# Patient Record
Sex: FEMALE | Race: WHITE | NOT HISPANIC OR LATINO | Employment: FULL TIME | URBAN - METROPOLITAN AREA
[De-identification: names, ages, dates, MRNs, and addresses within clinical notes are randomized per-mention and may not be internally consistent; named-entity substitution may affect disease eponyms.]

---

## 2018-03-11 ENCOUNTER — HOSPITAL ENCOUNTER (EMERGENCY)
Facility: HOSPITAL | Age: 23
Discharge: HOME | End: 2018-03-12
Attending: EMERGENCY MEDICINE | Admitting: EMERGENCY MEDICINE
Payer: COMMERCIAL

## 2018-03-11 VITALS
WEIGHT: 125 LBS | RESPIRATION RATE: 18 BRPM | HEART RATE: 80 BPM | DIASTOLIC BLOOD PRESSURE: 77 MMHG | SYSTOLIC BLOOD PRESSURE: 116 MMHG | HEIGHT: 68 IN | BODY MASS INDEX: 18.94 KG/M2 | OXYGEN SATURATION: 100 % | TEMPERATURE: 97.6 F

## 2018-03-11 DIAGNOSIS — S06.0X9A CONCUSSION: ICD-10-CM

## 2018-03-11 DIAGNOSIS — S06.9X9A MILD TRAUMATIC BRAIN INJURY (HCC): Primary | ICD-10-CM

## 2018-03-12 PROCEDURE — 99283 EMERGENCY DEPT VISIT LOW MDM: CPT
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
## 2018-03-14 ENCOUNTER — HOSPITAL ENCOUNTER (EMERGENCY)
Facility: HOSPITAL | Age: 23
Discharge: HOME | End: 2018-03-14
Attending: EMERGENCY MEDICINE
Payer: COMMERCIAL

## 2018-03-14 ENCOUNTER — APPOINTMENT (EMERGENCY)
Dept: CT IMAGING | Facility: HOSPITAL | Age: 23
End: 2018-03-14
Payer: COMMERCIAL

## 2018-03-14 VITALS
SYSTOLIC BLOOD PRESSURE: 109 MMHG | HEART RATE: 71 BPM | RESPIRATION RATE: 18 BRPM | OXYGEN SATURATION: 98 % | BODY MASS INDEX: 18.94 KG/M2 | HEIGHT: 68 IN | WEIGHT: 125 LBS | DIASTOLIC BLOOD PRESSURE: 70 MMHG | TEMPERATURE: 98.2 F

## 2018-03-14 DIAGNOSIS — S09.90XA INJURY OF HEAD, INITIAL ENCOUNTER: Primary | ICD-10-CM

## 2018-03-14 DIAGNOSIS — S00.83XA FACIAL HEMATOMA, INITIAL ENCOUNTER: ICD-10-CM

## 2018-03-14 PROCEDURE — 70450 CT HEAD/BRAIN W/O DYE: CPT

## 2018-03-14 PROCEDURE — 70486 CT MAXILLOFACIAL W/O DYE: CPT

## 2018-03-14 PROCEDURE — 99283 EMERGENCY DEPT VISIT LOW MDM: CPT

## 2018-03-15 NOTE — DISCHARGE INSTRUCTIONS
Ice to the area of pain  Rest  Follow up with your doctor for further evaluation  Facial Contusion   WHAT YOU NEED TO KNOW:   A facial contusion is a bruise that appears on your face after an injury  A bruise happens when small blood vessels tear but skin does not  When blood vessels tear, blood leaks into nearby tissue, such as soft tissue or muscle  You may develop swelling and bruising around your eyes if your bruise is on your brow, forehead, or the bridge of your nose  DISCHARGE INSTRUCTIONS:   Return to the emergency department if:   · You have a fever  · You have watery, clear fluid draining from your nose  · You have changes in your vision or eye appearance  · You have changes or pain with eye movement  · You have tingling or numbness in or near the injured area  Contact your healthcare provider if:   · You find a new lump in the injured area  · Your symptoms do not improve with treatment  · You have questions or concerns about your condition or care  Medicines:   · Acetaminophen  decreases pain  It is available without a doctor's order  Ask how much to take and how often to take it  Follow directions  Acetaminophen can cause liver damage if not taken correctly  · Take your medicine as directed  Contact your healthcare provider if you think your medicine is not helping or if you have side effects  Tell him of her if you are allergic to any medicine  Keep a list of the medicines, vitamins, and herbs you take  Include the amounts, and when and why you take them  Bring the list or the pill bottles to follow-up visits  Carry your medicine list with you in case of an emergency  Ice:  Apply ice on your bruise for 15 to 20 minutes every hour or as directed  Use an ice pack, or put crushed ice in a plastic bag  Cover it with a towel  Ice helps prevent tissue damage and decreases swelling and pain  Elevation:  Sleep with your head elevated to help decrease swelling     Help your contusion heal:  Do not  massage the area or put heating pads or other warming devices on the bruise right after your injury  Heat and massage may slow the healing of the area  Follow up with your healthcare provider as directed: You may need to return within a week to have your injury checked again  Write down any questions you have so you remember to ask them in your follow-up visits  Prevent a facial contusion:   · Use safety belts and child restraints  · Use safety helmets when you ride a bicycle or motorcycle  · Use a mouth and face guard during sports  © 2017 [address] [employer]  Information is for End User's use only and may not be sold, redistributed or otherwise used for commercial purposes  All illustrations and images included in CareNotes® are the copyrighted property of [employer]  or [employer]  The above information is an  only  It is not intended as medical advice for individual conditions or treatments  Talk to your doctor, nurse or pharmacist before following any medical regimen to see if it is safe and effective for you  Head Injury   WHAT YOU NEED TO KNOW:   A head injury is most often caused by a blow to the head  This may occur from a fall, bicycle injury, sports injury, being struck in the head, or a motor vehicle accident  DISCHARGE INSTRUCTIONS:   Call 911 or have someone else call for any of the following:   · You cannot be woken  · You have a seizure  · You stop responding to others or you faint  · You have blurry or double vision  · Your speech becomes slurred or confused  · You have arm or leg weakness, loss of feeling, or new problems with coordination  · Your pupils are larger than usual or one pupil is a different size than the other  · You have blood or clear fluid coming out of your ears or nose  Return to the emergency department if:   · You have repeated or forceful vomiting      · You feel confused  · Your headache gets worse or becomes severe  · You or someone caring for you notices that you are harder to wake than usual   Contact your healthcare provider if:   · Your symptoms last longer than 6 weeks after the injury  · You have questions or concerns about your condition or care  Medicines:   · Acetaminophen  decreases pain  Acetaminophen is available without a doctor's order  Ask how much to take and how often to take it  Follow directions  Acetaminophen can cause liver damage if not taken correctly  · Take your medicine as directed  Contact your healthcare provider if you think your medicine is not helping or if you have side effects  Tell him or her if you are allergic to any medicine  Keep a list of the medicines, vitamins, and herbs you take  Include the amounts, and when and why you take them  Bring the list or the pill bottles to follow-up visits  Carry your medicine list with you in case of an emergency  Self-care:   · Rest  or do quiet activities for 24 to 48 hours  Limit your time watching TV, using the computer, or doing tasks that require a lot of thinking  Slowly return to your normal activities as directed  Do not play sports or do activities that may cause you to get hit in the head  Ask your healthcare provider when you can return to sports  · Apply ice  on your head for 15 to 20 minutes every hour or as directed  Use an ice pack, or put crushed ice in a plastic bag  Cover it with a towel before you apply it to your skin  Ice helps prevent tissue damage and decreases swelling and pain  · Have someone stay with you for 24 hours  or as directed  This person can monitor you for complications and call [phone]  When you are awake the person should ask you a few questions to see if you are thinking clearly  An example would be to ask your name or your address  Prevent another head injury:   · Wear a helmet that fits properly    Do this when you play sports, or ride a bike, scooter, or skateboard  Helmets help decrease your risk of a serious head injury  Talk to your healthcare provider about other ways you can protect yourself if you play sports  · Wear your seat belt every time you are in a car  This helps to decrease your risk for a head injury if you are in a car accident  Follow up with your healthcare provider as directed:  Write down your questions so you remember to ask them during your visits  © 2017 [address] [employer]  Information is for End User's use only and may not be sold, redistributed or otherwise used for commercial purposes  All illustrations and images included in CareNotes® are the copyrighted property of [employer]  or [employer]  The above information is an  only  It is not intended as medical advice for individual conditions or treatments  Talk to your doctor, nurse or pharmacist before following any medical regimen to see if it is safe and effective for you

## 2018-03-15 NOTE — ED NOTES
Patient transported to [facility] [address] [place] [address]  03/14/18 2019
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
## 2023-09-28 ENCOUNTER — OFFICE VISIT (OUTPATIENT)
Dept: URGENT CARE | Facility: CLINIC | Age: 28
End: 2023-09-28
Payer: COMMERCIAL

## 2023-09-28 VITALS
OXYGEN SATURATION: 98 % | BODY MASS INDEX: 23.35 KG/M2 | SYSTOLIC BLOOD PRESSURE: 126 MMHG | HEART RATE: 92 BPM | TEMPERATURE: 98.1 F | DIASTOLIC BLOOD PRESSURE: 82 MMHG | WEIGHT: 153.6 LBS

## 2023-09-28 DIAGNOSIS — R30.0 BURNING WITH URINATION: ICD-10-CM

## 2023-09-28 DIAGNOSIS — N61.0 MASTITIS, RIGHT, ACUTE: ICD-10-CM

## 2023-09-28 DIAGNOSIS — N89.8 VAGINAL DISCHARGE: ICD-10-CM

## 2023-09-28 DIAGNOSIS — R50.9 FEVER, UNSPECIFIED FEVER CAUSE: Primary | ICD-10-CM

## 2023-09-28 LAB
SARS-COV-2 AG UPPER RESP QL IA: NEGATIVE
SL AMB  POCT GLUCOSE, UA: NEGATIVE
SL AMB LEUKOCYTE ESTERASE,UA: ABNORMAL
SL AMB POCT BILIRUBIN,UA: NEGATIVE
SL AMB POCT BLOOD,UA: NEGATIVE
SL AMB POCT CLARITY,UA: CLEAR
SL AMB POCT COLOR,UA: YELLOW
SL AMB POCT KETONES,UA: NEGATIVE
SL AMB POCT NITRITE,UA: NEGATIVE
SL AMB POCT PH,UA: 5
SL AMB POCT SPECIFIC GRAVITY,UA: 1.01
SL AMB POCT URINE PROTEIN: NEGATIVE
SL AMB POCT UROBILINOGEN: 0.2
VALID CONTROL: NORMAL

## 2023-09-28 PROCEDURE — 87077 CULTURE AEROBIC IDENTIFY: CPT

## 2023-09-28 PROCEDURE — S9088 SERVICES PROVIDED IN URGENT: HCPCS

## 2023-09-28 PROCEDURE — 87086 URINE CULTURE/COLONY COUNT: CPT

## 2023-09-28 PROCEDURE — 81002 URINALYSIS NONAUTO W/O SCOPE: CPT

## 2023-09-28 PROCEDURE — 87811 SARS-COV-2 COVID19 W/OPTIC: CPT

## 2023-09-28 PROCEDURE — 99213 OFFICE O/P EST LOW 20 MIN: CPT

## 2023-09-28 RX ORDER — FLUTICASONE PROPIONATE 50 MCG
2 SPRAY, SUSPENSION (ML) NASAL DAILY
COMMUNITY
Start: 2023-05-20

## 2023-09-28 RX ORDER — ALBUTEROL SULFATE 90 UG/1
2 AEROSOL, METERED RESPIRATORY (INHALATION) EVERY 6 HOURS PRN
COMMUNITY
Start: 2023-05-20

## 2023-09-28 RX ORDER — CEPHALEXIN 500 MG/1
500 CAPSULE ORAL EVERY 6 HOURS SCHEDULED
Qty: 28 CAPSULE | Refills: 0 | Status: SHIPPED | OUTPATIENT
Start: 2023-09-28 | End: 2023-10-05

## 2023-09-28 NOTE — PROGRESS NOTES
[facility]        NAME: Ralph Johnson is a 33 y.o. female  : 1995    MRN: [id number]  DATE: 2023  TIME: 7:58 PM    Assessment and Plan   Fever, unspecified fever cause [R50.9]  1. Fever, unspecified fever cause  Poct Covid 19 Rapid Antigen Test      2. Burning with urination  POCT urine dip    Urine culture    cephalexin (KEFLEX) 500 mg capsule      3. Mastitis, right, acute  cephalexin (KEFLEX) 500 mg capsule      4. Vaginal discharge          PT is 1 month postpartum. Urine dip positive for trace leukocyte, negative nitrite and negative blood. Sending for urine culture. Informed patient that we do not do internal exams or vaginal swabs in the clinic-advised follow-up with her OB/GYN for this issue. Right breast-there is tenderness to palpation surrounding the areola. Mild increase in warmth. No open wound or rash. Mild dense tissue. Starting on Keflex to help treat mastitis and possible urinary tract infection. Rapid COVID test was negative- fever and chills may be related to mastitis or possible urine infection. PT sitting comfortably in the room with no acute distress. Advised to go to the emergency room if symptoms worsen. Patient Instructions     Take prescribed medication as instructed. Continue breast-feeding from both breasts. Tylenol or ibuprofen for pain or fever. Make sure to stay well-hydrated and rest.  Urine culture sent-we will notify you of results if necessary in the next 2 to 3 days. Recommended to follow-up with OB/GYN for recent right breast tenderness/mastitis and vaginal discharge. Follow up with PCP in 3-5 days. Proceed to  ER if symptoms worsen. Chief Complaint     Chief Complaint   Patient presents with   • Cold Like Symptoms     C/o body aches, chills, fever since yesterday. Has taken ibuprofen/tylenol; with some relief. Denies any sick contacts. Reports intermittent abd pain.     • Breast Pain     C/o right breast pain/tenderness since yesterday. Pt is 1 month postpartum and exclusively breastfeeding. Denies any breast discharge. Reports breast is warm to touch and stinging pain 4/10. • Possible UTI     Reports about a week ago, vaginal discharge greenish color. Reports dysuria, and burning. Denies any strong odor or dark urine, no hematuria. History of Present Illness       41-year-old female presents to the clinic for body aches, chills, fever that began yesterday. Taking Tylenol/ibuprofen with some relief. No sick contacts. PT also admits to lower suprapubic abdominal discomfort/cramping that began yesterday. Denies change in bowel habits. Does admit to some pain with urination and burning with urination. PT states that she is 1 month postpartum. Also states that she has had a green-colored discharge for the past week-denies any concerns for STDs and sexually active with 1 partner. Denies any odor or dark urine or blood in the urine. PT also states she is having right breast tenderness surrounding the nipple region. Admits that pain was worse yesterday and described as stinging like pain and has been warm to the touch. PT has been exclusively breast-feeding. Denies any chest pain, shortness of breath, nausea, vomiting, diarrhea. Review of Systems   Review of Systems   Constitutional: Positive for chills and fever. HENT: Positive for congestion. Negative for ear discharge, ear pain and sore throat. Eyes: Negative. Respiratory: Negative. Cardiovascular: Negative. Gastrointestinal: Positive for abdominal pain. Negative for blood in stool, constipation, diarrhea, nausea and vomiting. Genitourinary: Positive for dysuria and vaginal discharge. Negative for frequency, hematuria, pelvic pain, urgency, vaginal bleeding and vaginal pain. Musculoskeletal: Positive for myalgias. Right breast pain and warmth     Skin: Negative. Neurological: Negative.           Current Medications       Current Outpatient Medications:   •  albuterol (PROVENTIL HFA,VENTOLIN HFA) 90 mcg/act inhaler, Inhale 2 puffs every 6 (six) hours as needed, Disp: , Rfl:   •  cephalexin (KEFLEX) 500 mg capsule, Take 1 capsule (500 mg total) by mouth every 6 (six) hours for 7 days, Disp: 28 capsule, Rfl: 0  •  fluticasone (FLONASE) 50 mcg/act nasal spray, 2 sprays into each nostril daily, Disp: , Rfl:     Current Allergies     Allergies as of 09/28/2023   • (No Known Allergies)            The following portions of the patient's history were reviewed and updated as appropriate: allergies, current medications, past family history, past medical history, past social history, past surgical history and problem list.     Past Medical History:   Diagnosis Date   • Anorexia    • Asthma        Past Surgical History:   Procedure Laterality Date   • TONSILLECTOMY         No family history on file. Medications have been verified. Objective   /82   Pulse 92   Temp 98.1 °F (36.7 °C)   Wt 69.7 kg (153 lb 9.6 oz)   SpO2 98%   BMI 23.35 kg/m²        Physical Exam     Physical Exam  Constitutional:       General: She is not in acute distress. Appearance: Normal appearance. She is not ill-appearing. HENT:      Head: Normocephalic and atraumatic. Right Ear: Tympanic membrane, ear canal and external ear normal.      Left Ear: Tympanic membrane, ear canal and external ear normal.      Nose: Nose normal.      Mouth/Throat:      Mouth: Mucous membranes are moist.      Pharynx: Oropharynx is clear. No posterior oropharyngeal erythema. Eyes:      Extraocular Movements: Extraocular movements intact. Conjunctiva/sclera: Conjunctivae normal.      Pupils: Pupils are equal, round, and reactive to light. Cardiovascular:      Rate and Rhythm: Normal rate and regular rhythm. Pulses: Normal pulses. Heart sounds: Normal heart sounds.    Pulmonary:      Effort: Pulmonary effort is normal.      Breath sounds: Normal breath sounds. Abdominal:      General: Abdomen is flat. Bowel sounds are normal. There is no distension. Palpations: Abdomen is soft. There is no mass. Tenderness: There is abdominal tenderness (mild lower abd tenderness - described as cramping by pt). There is no right CVA tenderness, left CVA tenderness, guarding or rebound. Negative signs include Hart's sign, Rovsing's sign, McBurney's sign and obturator sign. Musculoskeletal:      Cervical back: Normal range of motion and neck supple. Skin:     General: Skin is warm and dry. Capillary Refill: Capillary refill takes less than 2 seconds. Comments: Chaperone present (nurse Bronson) -there is reproducible tenderness to palpation of the right breast with palpable dense breast tissue from the 6 to 12 o'clock region on the right breast.  Mild increase in warmth. No significant erythema. No open wound or bruising. No rash around the breast/areola. Neurological:      General: No focal deficit present. Mental Status: She is alert and oriented to person, place, and time. Mental status is at baseline.    Psychiatric:         Mood and Affect: Mood normal.         Behavior: Behavior normal.

## 2023-09-28 NOTE — PATIENT INSTRUCTIONS
Take prescribed medication as instructed. Continue breast-feeding from both breasts. Tylenol or ibuprofen for pain or fever. Make sure to stay well-hydrated and rest.  Urine culture sent-we will notify you of results if necessary in the next 2 to 3 days. Recommended to follow-up with OB/GYN for recent right breast tenderness/mastitis and vaginal discharge. Follow up with PCP in 3-5 days. Proceed to  ER if symptoms worsen. Dysuria   WHAT YOU NEED TO KNOW:   Dysuria is difficulty urinating, or pain, burning, or discomfort with urination. Dysuria is usually a symptom of another problem. DISCHARGE INSTRUCTIONS:   Return to the emergency department if:   You have severe back, side, or abdominal pain. You have fever and shaking chills. You vomit several times in a row. Contact your healthcare provider if:   Your symptoms do not go away, even after treatment. You have questions or concerns about your condition or care. Medicines:   Medicines  may be given to help treat a bacterial infection or help decrease bladder spasms. Take your medicine as directed. Contact your healthcare provider if you think your medicine is not helping or if you have side effects. Tell your provider if you are allergic to any medicine. Keep a list of the medicines, vitamins, and herbs you take. Include the amounts, and when and why you take them. Bring the list or the pill bottles to follow-up visits. Carry your medicine list with you in case of an emergency. Follow up with your healthcare provider as directed: Your healthcare provider may also refer you to a urologist or nephrologist to have additional testing. Write down your questions so you remember to ask them during your visits. Manage your dysuria:   Drink more liquids. Liquids help flush out bacteria that may be causing an infection. Ask your healthcare provider how much liquid to drink each day and which liquids are best for you.      Take sitz baths as directed. Fill a bathtub with 4 to 6 inches of warm water. You may also use a sitz bath pan that fits over a toilet. Sit in the sitz bath for 20 minutes. Do this 2 to 3 times a day, or as directed. The warm water can help decrease pain and swelling. © Copyright Vonda Aparicio 2023 Information is for End User's use only and may not be sold, redistributed or otherwise used for commercial purposes. The above information is an  only. It is not intended as medical advice for individual conditions or treatments. Talk to your doctor, nurse or pharmacist before following any medical regimen to see if it is safe and effective for you. Fever in Adults   WHAT YOU NEED TO KNOW:   A fever is an increase in your body temperature. Normal body temperature is 98.6°F (37°C). Fever is generally defined as greater than 100.4°F (38°C). Common causes include an infection, injury, or disease such as arthritis. DISCHARGE INSTRUCTIONS:   Return to the emergency department if:   Your fever does not go away or gets worse even after treatment. You have a stiff neck and a bad headache. You are confused. You may not be able to think clearly or remember things like you normally do. Your heart beats faster than usual even after treatment. You have shortness of breath or chest pain when you breathe. You urinate small amounts or not at all. Your skin, lips, or nails turn blue. Contact your healthcare provider if:   You have abdominal pain or you feel bloated. You have nausea or are vomiting. You have pain or burning when you urinate, or you have pain in your back. You have questions or concerns about your condition or care. Medicines: You may need any of the following:  NSAIDs , such as ibuprofen, help decrease swelling, pain, and fever. This medicine is available with or without a doctor's order. NSAIDs can cause stomach bleeding or kidney problems in certain people.  If you take blood thinner medicine, always ask if NSAIDs are safe for you. Always read the medicine label and follow directions. Do not give these medicines to children younger than 6 months without direction from a healthcare provider. Acetaminophen  decreases pain and fever. It is available without a doctor's order. Ask how much to take and how often to take it. Follow directions. Read the labels of all other medicines you are using to see if they also contain acetaminophen, or ask your doctor or pharmacist. Acetaminophen can cause liver damage if not taken correctly. Antibiotics  may be given if you have an infection caused by bacteria. Take your medicine as directed. Contact your healthcare provider if you think your medicine is not helping or if you have side effects. Tell your provider if you are allergic to any medicine. Keep a list of the medicines, vitamins, and herbs you take. Include the amounts, and when and why you take them. Bring the list or the pill bottles to follow-up visits. Carry your medicine list with you in case of an emergency. Follow up with your healthcare provider as directed:  Write down your questions so you remember to ask them during your visits. Self-care:   Drink more liquids as directed. A fever makes you sweat. This can increase your risk for dehydration. Liquids can help prevent dehydration. Drink at least 6 to 8 eight-ounce cups of clear liquids each day. Drink water, juice, or broth. Do not drink sports drinks. They may contain caffeine. Ask your healthcare provider if you should drink an oral rehydration solution (ORS). An ORS has the right amounts of water, salts, and sugar you need to replace body fluids. Dress in lightweight clothes. Shivers may be a sign that your fever is rising. Do not put extra blankets or clothes on. This may cause your fever to rise even higher. Dress in light, comfortable clothing. Use a lightweight blanket or sheet when you sleep.  Change your clothes, blanket, or sheets if they get wet. Cool yourself safely. Take a bath in cool or lukewarm water. Use an ice pack wrapped in a small towel or wet a washcloth with cool water. Place the ice pack or wet washcloth on your forehead or the back of your neck. © Copyright Genoveva Ferguson 2023 Information is for End User's use only and may not be sold, redistributed or otherwise used for commercial purposes. The above information is an  only. It is not intended as medical advice for individual conditions or treatments. Talk to your doctor, nurse or pharmacist before following any medical regimen to see if it is safe and effective for you. Mastitis   WHAT YOU NEED TO KNOW:   Mastitis is an infection of breast tissue that most often occurs in women who breastfeed. It can happen any time during breastfeeding, but usually occurs within the first 3 months after giving birth. Usually only one breast is affected. DISCHARGE INSTRUCTIONS:   Contact your healthcare provider if:   Your symptoms do not get better within 2 days. You have a painful lump in your breast.     You have swollen and tender lymph nodes in your armpit on the same side as the affected breast.    You have questions or concerns about your condition or care. Medicines: You may need any of the following:  Antibiotics  help treat or prevent a bacterial infection. Acetaminophen  decreases pain and fever. It is available without a doctor's order. Ask how much to take and how often to take it. Follow directions. Acetaminophen can cause liver damage if not taken correctly. NSAIDs , such as ibuprofen, help decrease swelling, pain, and fever. This medicine is available with or without a doctor's order. NSAIDs can cause stomach bleeding or kidney problems in certain people. If you take blood thinner medicine, always ask your healthcare provider if NSAIDs are safe for you. Always read the medicine label and follow directions.     Take your medicine as directed. Contact your healthcare provider if you think your medicine is not helping or if you have side effects. Tell your provider if you are allergic to any medicine. Keep a list of the medicines, vitamins, and herbs you take. Include the amounts, and when and why you take them. Bring the list or the pill bottles to follow-up visits. Carry your medicine list with you in case of an emergency. Manage your symptoms:   Continue to breastfeed from the affected breast.  This will help to prevent an abscess from forming. Breastfeed your baby on the affected side first. Apply a warm, wet cloth on your breast or take a warm shower before you feed your baby. This can help increase your milk flow. If it is painful when you breastfeed from the affected breast, feed your baby from the other breast first. Pump the affected side to completely drain your breast after breastfeeding, if needed. You may save the pumped milk to feed your baby. Use different positions to breastfeed. Change the position of your baby during feedings. This may help to relieve your discomfort. Apply heat on your breast for 20 to 30 minutes every 2 hours for as many days as directed. Heat helps decrease pain. Apply ice after feedings. Apply ice on your breast for 15 to 20 minutes every hour or as directed. Use an ice pack, or put crushed ice in a plastic bag. Cover it with a towel. Ice helps prevent tissue damage and decreases swelling and pain. Massage your breast.  Gently massage your breast before and during breastfeeding to help drain your milk. Drink liquids as directed. Ask how much liquid to drink each day and which liquids are best for you. Rest as needed. Do not sleep on your stomach until your infection is gone. Prevent mastitis:   Breastfeed every 2 or 3 hours to prevent engorgement.   Breast engorgement develops when too much milk builds up in your breast. Take your time when you breastfeed to allow your baby to empty your breast. Try not to switch breasts too early. Express or pump after you breastfeed if your baby is not emptying your breasts when he or she feeds. Prevent sore and cracked nipples. A good latch prevents sore and cracked nipples. If you have sore nipples after breastfeeding, your baby may not be latched on properly. Gently break suction and reposition if your baby is only sucking on the nipple. Talk to a lactation consultant if you need help with your baby's latch. Care for your breasts. Keep your nipples clean and dry between feedings. Check them for cracks, blisters, or other irritated areas. Ask a lactation specialist or your healthcare provider how to treat sore and cracked nipples. Wash your hands before and after you breastfeed your baby or pump your breasts. Wear a comfortable nursing bra that supports your breasts but is not too tight. Follow up with your doctor as directed:  Write down your questions so you remember to ask them during your visits. © Copyright Brian Sotomayor 2023 Information is for End User's use only and may not be sold, redistributed or otherwise used for commercial purposes. The above information is an  only. It is not intended as medical advice for individual conditions or treatments. Talk to your doctor, nurse or pharmacist before following any medical regimen to see if it is safe and effective for you.

## 2023-09-30 LAB — BACTERIA UR CULT: ABNORMAL

## 2023-10-02 ENCOUNTER — TELEPHONE (OUTPATIENT)
Dept: URGENT CARE | Facility: CLINIC | Age: 28
End: 2023-10-02

## 2023-10-02 DIAGNOSIS — B96.89 GARDNERELLA VAGINITIS: Primary | ICD-10-CM

## 2023-10-02 DIAGNOSIS — N76.0 GARDNERELLA VAGINITIS: Primary | ICD-10-CM

## 2023-10-02 RX ORDER — METRONIDAZOLE 500 MG/1
500 TABLET ORAL EVERY 12 HOURS SCHEDULED
Qty: 14 TABLET | Refills: 0 | Status: SHIPPED | OUTPATIENT
Start: 2023-10-02 | End: 2023-10-09

## 2023-12-19 ENCOUNTER — OFFICE VISIT (OUTPATIENT)
Dept: URGENT CARE | Facility: CLINIC | Age: 28
End: 2023-12-19
Payer: COMMERCIAL

## 2023-12-19 VITALS
HEART RATE: 81 BPM | OXYGEN SATURATION: 95 % | HEIGHT: 68 IN | SYSTOLIC BLOOD PRESSURE: 106 MMHG | BODY MASS INDEX: 23.07 KG/M2 | TEMPERATURE: 97.8 F | DIASTOLIC BLOOD PRESSURE: 62 MMHG | WEIGHT: 152.2 LBS | RESPIRATION RATE: 17 BRPM

## 2023-12-19 DIAGNOSIS — J02.9 SORE THROAT: ICD-10-CM

## 2023-12-19 DIAGNOSIS — R05.1 ACUTE COUGH: Primary | ICD-10-CM

## 2023-12-19 DIAGNOSIS — Z87.09 HISTORY OF ASTHMA: ICD-10-CM

## 2023-12-19 PROCEDURE — 99213 OFFICE O/P EST LOW 20 MIN: CPT

## 2023-12-19 PROCEDURE — S9088 SERVICES PROVIDED IN URGENT: HCPCS

## 2023-12-19 RX ORDER — PREDNISONE 20 MG/1
40 TABLET ORAL DAILY
Qty: 10 TABLET | Refills: 0 | Status: SHIPPED | OUTPATIENT
Start: 2023-12-19 | End: 2023-12-24

## 2023-12-19 NOTE — PROGRESS NOTES
[facility]        NAME: Kacey Saunders is a 28 y.o. female  : 1995    MRN: [id number]  DATE: 2023  TIME: 6:47 PM    Assessment and Plan   Acute cough [R05.1]  1. Acute cough  predniSONE 20 mg tablet      2. Sore throat        3. History of asthma  predniSONE 20 mg tablet        3 days of cough, sore throat, chest congestion.  Using albuterol inhaler at home.  Lungs are clear to auscultation without any wheezing, rales, rhonchi.  No tachypnea or tachycardia.  Will give short 5 days course of prednisone.  Advised follow-up with family doctor if no improvement.  Advised to go to the ER if any symptoms worsen.    Patient Instructions     Take prescribed medication as instructed.  Avoid ibuprofen until finished with prednisone.  Tylenol for pain or fever  Continue with albuterol inhaler as previously prescribed.  Make sure to drink plenty of fluids and rest.  May try warm tea with honey, teaspoon of honey, throat lozenges, warm salt  gargles for sore throat.  If no improvement, follow-up with family doctor.  Go to the ER if any symptoms worsen.  Follow up with PCP in 3-5 days.  Proceed to  ER if symptoms worsen.    Chief Complaint     Chief Complaint   Patient presents with    Sore Throat    chest congestion     Started 3 days ago  OTC cold medication         History of Present Illness       28-year-old female here with 3 days of cough, sore throat, chest congestion.  History of asthma-has been using albuterol inhaler at home slightly more than usual.  Took some over-the-counter cold medication.  Children have been sick with cough and runny noses recently.  PT was recently diagnosed with EBV by her family doctor.  She denies any current fever, chills, nasal congestion, sinus pressure, chest pain, shortness of breath, abdominal pain.        Review of Systems   Review of Systems   Constitutional:  Negative for chills and fever.   HENT:  Positive for sore throat. Negative for congestion,  "rhinorrhea, sinus pressure and sinus pain.    Eyes: Negative.    Respiratory:  Positive for cough. Negative for shortness of breath and wheezing.    Cardiovascular: Negative.    Gastrointestinal: Negative.    Genitourinary: Negative.    Musculoskeletal: Negative.    Neurological: Negative.          Current Medications       Current Outpatient Medications:     predniSONE 20 mg tablet, Take 2 tablets (40 mg total) by mouth daily for 5 days, Disp: 10 tablet, Rfl: 0    albuterol (PROVENTIL HFA,VENTOLIN HFA) 90 mcg/act inhaler, Inhale 2 puffs every 6 (six) hours as needed, Disp: , Rfl:     fluticasone (FLONASE) 50 mcg/act nasal spray, 2 sprays into each nostril daily, Disp: , Rfl:     Current Allergies     Allergies as of 12/19/2023    (No Known Allergies)            The following portions of the patient's history were reviewed and updated as appropriate: allergies, current medications, past family history, past medical history, past social history, past surgical history and problem list.     Past Medical History:   Diagnosis Date    Anorexia     Asthma        Past Surgical History:   Procedure Laterality Date    TONSILLECTOMY         History reviewed. No pertinent family history.      Medications have been verified.        Objective   /62   Pulse 81   Temp 97.8 °F (36.6 °C)   Resp 17   Ht 5' 8\" (1.727 m)   Wt 69 kg (152 lb 3.2 oz)   SpO2 95%   BMI 23.14 kg/m²        Physical Exam     Physical Exam  Constitutional:       General: She is awake. She is not in acute distress.     Appearance: Normal appearance. She is not ill-appearing, toxic-appearing or diaphoretic.   HENT:      Head: Normocephalic and atraumatic.      Right Ear: Tympanic membrane, ear canal and external ear normal.      Left Ear: Tympanic membrane, ear canal and external ear normal.      Nose: Nose normal.      Mouth/Throat:      Mouth: Mucous membranes are moist.      Pharynx: Oropharynx is clear. No oropharyngeal exudate or posterior " oropharyngeal erythema.   Eyes:      Extraocular Movements: Extraocular movements intact.      Conjunctiva/sclera: Conjunctivae normal.      Pupils: Pupils are equal, round, and reactive to light.   Cardiovascular:      Rate and Rhythm: Normal rate and regular rhythm.      Pulses: Normal pulses.      Heart sounds: Normal heart sounds.   Pulmonary:      Effort: Pulmonary effort is normal. No tachypnea, bradypnea, accessory muscle usage, prolonged expiration, respiratory distress or retractions.      Breath sounds: Normal breath sounds and air entry. No stridor, decreased air movement or transmitted upper airway sounds. No decreased breath sounds, wheezing, rhonchi or rales.   Chest:      Chest wall: No tenderness.   Lymphadenopathy:      Cervical: No cervical adenopathy.   Skin:     General: Skin is warm and dry.      Capillary Refill: Capillary refill takes less than 2 seconds.      Findings: No rash.   Neurological:      General: No focal deficit present.      Mental Status: She is alert, oriented to person, place, and time and easily aroused. Mental status is at baseline.   Psychiatric:         Mood and Affect: Mood normal.         Behavior: Behavior normal. Behavior is cooperative.

## 2023-12-19 NOTE — PATIENT INSTRUCTIONS
Take prescribed medication as instructed.  Avoid ibuprofen until finished with prednisone.  Tylenol for pain or fever  Continue with albuterol inhaler as previously prescribed.  Make sure to drink plenty of fluids and rest.  May try warm tea with honey, teaspoon of honey, throat lozenges, warm salt  gargles for sore throat.  If no improvement, follow-up with family doctor.  Go to the ER if any symptoms worsen.  Follow up with PCP in 3-5 days.  Proceed to  ER if symptoms worsen.  Acute Cough   WHAT YOU NEED TO KNOW:   An acute cough can last up to 3 weeks. Common causes of an acute cough include a cold, allergies, or a lung infection.  DISCHARGE INSTRUCTIONS:   Return to the emergency department if:   You have trouble breathing or feel short of breath.    You cough up blood, or you see blood in your mucus.    You faint or feel weak or dizzy.    You have chest pain when you cough or take a deep breath.    You have new wheezing.    Contact your healthcare provider if:   You have a fever.    Your cough lasts longer than 4 weeks.    Your symptoms do not improve with treatment.    You have questions or concerns about your condition or care.    Medicines:   Medicines  may be needed to stop the cough, decrease swelling in your airways, or help open your airways. Medicine may also be given to help you cough up mucus. Ask your healthcare provider what over-the-counter medicines you can take. If you have an infection caused by bacteria, you may need antibiotics.    Take your medicine as directed.  Contact your healthcare provider if you think your medicine is not helping or if you have side effects. Tell your provider if you are allergic to any medicine. Keep a list of the medicines, vitamins, and herbs you take. Include the amounts, and when and why you take them. Bring the list or the pill bottles to follow-up visits. Carry your medicine list with you in case of an emergency.    Manage your symptoms:   Do not smoke and stay  away from others who smoke.  Nicotine and other chemicals in cigarettes and cigars can cause lung damage and make your cough worse. Ask your healthcare provider for information if you currently smoke and need help to quit. E-cigarettes or smokeless tobacco still contain nicotine. Talk to your healthcare provider before you use these products.    Drink extra liquids as directed.  Liquids will help thin and loosen mucus so you can cough it up. Liquids will also help prevent dehydration. Examples of good liquids to drink include water, fruit juice, and broth. Do not drink liquids that contain caffeine. Caffeine can increase your risk for dehydration. Ask your healthcare provider how much liquid to drink each day.    Rest as directed.  Do not do activities that make your cough worse, such as exercise.    Use a humidifier or vaporizer.  Use a cool mist humidifier or a vaporizer to increase air moisture in your home. This may make it easier for you to breathe and help decrease your cough.    Eat 2 to 5 mL of honey 2 times each day.  Honey can help thin mucus and decrease your cough.    Use cough drops or lozenges.  These can help decrease throat irritation and your cough.    Follow up with your healthcare provider as directed:  Write down your questions so you remember to ask them during your visits.  © Copyright [employer] 2023 Information is for End User's use only and may not be sold, redistributed or otherwise used for commercial purposes.  The above information is an  only. It is not intended as medical advice for individual conditions or treatments. Talk to your doctor, nurse or pharmacist before following any medical regimen to see if it is safe and effective for you.

## 2024-01-08 ENCOUNTER — OFFICE VISIT (OUTPATIENT)
Dept: URGENT CARE | Facility: CLINIC | Age: 29
End: 2024-01-08
Payer: COMMERCIAL

## 2024-01-08 VITALS — RESPIRATION RATE: 18 BRPM | TEMPERATURE: 97.1 F | OXYGEN SATURATION: 98 % | HEART RATE: 88 BPM

## 2024-01-08 DIAGNOSIS — S51.812A LACERATION OF LEFT FOREARM, INITIAL ENCOUNTER: Primary | ICD-10-CM

## 2024-01-08 DIAGNOSIS — Z23 ENCOUNTER FOR IMMUNIZATION: ICD-10-CM

## 2024-01-08 PROCEDURE — 90715 TDAP VACCINE 7 YRS/> IM: CPT | Performed by: PHYSICIAN ASSISTANT

## 2024-01-08 PROCEDURE — 96372 THER/PROPH/DIAG INJ SC/IM: CPT | Performed by: PHYSICIAN ASSISTANT

## 2024-01-08 PROCEDURE — S9088 SERVICES PROVIDED IN URGENT: HCPCS | Performed by: PHYSICIAN ASSISTANT

## 2024-01-08 PROCEDURE — 99213 OFFICE O/P EST LOW 20 MIN: CPT | Performed by: PHYSICIAN ASSISTANT

## 2024-01-08 PROCEDURE — 90715 TDAP VACCINE 7 YRS/> IM: CPT

## 2024-01-08 PROCEDURE — 12001 RPR S/N/AX/GEN/TRNK 2.5CM/<: CPT | Performed by: PHYSICIAN ASSISTANT

## 2024-01-09 NOTE — PROGRESS NOTES
[facility]        NAME: Kacey Saunders is a 28 y.o. female  : 1995    MRN: [id number]  DATE: 2024  TIME: 7:14 PM      Assessment and Plan     Laceration of left forearm, initial encounter [S51.812A]  1. Laceration of left forearm, initial encounter        2. Encounter for immunization  Tdap Vaccine greater than or equal to 6yo        Note:   Repaired wound and updated tetanus in office   Patient should have sutures removed in 10-14 days.   Antibiotics not necessary, bacitracin should prevent infection.     Patient Instructions     Patient Instructions   Laceration Care    Cleaning the wound   - Clean the wound 2-3 times daily with a 1:1 mixture peroxide water or normal saline.    - Clean off all scabbing debris that may form on the wound line gently.   - Pat dry with a clean towel or gauze, apply Neosporin or bacitracin, and apply clean dressing.   - Do not get the sutures wet for long periods of time.   - Return here or go to the ER immediately with any signs of infection (increased redness, increased swelling, increased pain, pus drainage, or fever)  - Return here or another medical facility for suture removal in (10-14) days.         Follow up with PCP in 3-5 days.  Go to ER if symptoms worsen.    Chief Complaint     Chief Complaint   Patient presents with    Extremity Laceration     Patient reported around 1600 today she was opening her daughters toy with a clean knife and it went into her arm instead. Patient reported that only the tip went in and she immediately removed it. Patient reported initially arm was squirting and believes she lost about 45mL of blood. Patient reported EMS was called, they packed up her arm and told her to go to urgent care at some point because the hospitals were full. EMS refused to take her to hospital despite wanting to go. EMS did not clean wound.          History of Present Illness     Patient presents with a laceration to her left forearm x 4pm  today. She accidentally stabbed her am with a clean knife (only the tip). When she pulled the knife out, blood was spurting out. She called EMS and they came, washed the wound with saline, and bandage it. They told her that they would not take her to the hospital because they were packed and it was not necessary. They told her to go to urgent care and they would not transfer to the ED. Last tetanus was 4/2019.         Review of Systems     Review of Systems   Constitutional:  Negative for chills, fatigue and fever.   HENT:  Negative for congestion, ear pain, postnasal drip, rhinorrhea, sinus pressure, sinus pain and sore throat.    Eyes:  Negative for pain and visual disturbance.   Respiratory:  Negative for cough, chest tightness and shortness of breath.    Cardiovascular:  Negative for chest pain and palpitations.   Gastrointestinal:  Negative for abdominal pain, diarrhea, nausea and vomiting.   Genitourinary:  Negative for dysuria and hematuria.   Musculoskeletal:  Negative for arthralgias, back pain and myalgias.   Skin:  Positive for wound. Negative for rash.   Neurological:  Negative for dizziness, seizures, syncope, numbness and headaches.   All other systems reviewed and are negative.        Current Medications       Current Outpatient Medications:     albuterol (PROVENTIL HFA,VENTOLIN HFA) 90 mcg/act inhaler, Inhale 2 puffs every 6 (six) hours as needed, Disp: , Rfl:     Current Allergies     Allergies as of 01/08/2024    (No Known Allergies)              The following portions of the patient's history were reviewed and updated as appropriate: allergies, current medications, past family history, past medical history, past social history, past surgical history, and problem list.     Past Medical History:   Diagnosis Date    Anemia     Anorexia     Asthma        Past Surgical History:   Procedure Laterality Date    TONSILLECTOMY         History reviewed. No pertinent family history.      Medications have been  "verified.        Objective     Pulse 88   Temp (!) 97.1 °F (36.2 °C) (Tympanic)   Resp 18   LMP 01/04/2024 (Approximate)   SpO2 98%   Patient's last menstrual period was 01/04/2024 (approximate).         Physical Exam     Physical Exam  Vitals and nursing note reviewed.   Constitutional:       Appearance: Normal appearance. She is normal weight.   Cardiovascular:      Rate and Rhythm: Normal rate and regular rhythm.      Pulses: Normal pulses.      Heart sounds: Normal heart sounds.   Pulmonary:      Effort: Pulmonary effort is normal.      Breath sounds: Normal breath sounds.   Skin:     General: Skin is warm and dry.      Comments: Left mid anterior forearm with 1.5cm linear laceration. Bleeding without pressure dressing. SQ fat exposed. No foreign bodies.    Neurological:      General: No focal deficit present.      Mental Status: She is alert and oriented to person, place, and time.   Psychiatric:         Mood and Affect: Mood normal.         Behavior: Behavior normal.       Universal Protocol:  Consent: Verbal consent obtained.  Risks and benefits: risks, benefits and alternatives were discussed  Consent given by: patient  Time out: Immediately prior to procedure a \"time out\" was called to verify the correct patient, procedure, equipment, support staff and site/side marked as required.  Patient understanding: patient states understanding of the procedure being performed  Patient consent: the patient's understanding of the procedure matches consent given  Procedure consent: procedure consent matches procedure scheduled  Patient identity confirmed: verbally with patient  Laceration repair    Date/Time: 1/8/2024 5:30 PM    Performed by: Stephanie Lees PA-C  Authorized by: Stephanie Lees PA-C  Body area: upper extremity  Location details: left lower arm  Laceration length: 1.5 cm  Foreign bodies: no foreign bodies  Tendon involvement: none  Nerve involvement: none  Vascular damage: no  Anesthesia: local " infiltration    Anesthesia:  Local Anesthetic: lidocaine 1% with epinephrine  Anesthetic total: 0.5 mL    Sedation:  Patient sedated: no      Wound Dehiscence:  Superficial Wound Dehiscence: simple closure      Procedure Details:  Preparation: Patient was prepped and draped in the usual sterile fashion.  Irrigation solution: saline  Irrigation method: syringe  Amount of cleaning: standard  Debridement: none  Degree of undermining: none  Skin closure: 4-0 nylon  Number of sutures: 2  Technique: simple  Approximation: close  Approximation difficulty: simple  Dressing: antibiotic ointment, non-adherent pad, gauze roll, and coban.  Patient tolerance: patient tolerated the procedure well with no immediate complications

## 2024-01-09 NOTE — PATIENT INSTRUCTIONS
Laceration Care    Cleaning the wound   - Clean the wound 2-3 times daily with a 1:1 mixture peroxide water or normal saline.    - Clean off all scabbing debris that may form on the wound line gently.   - Pat dry with a clean towel or gauze, apply Neosporin or bacitracin, and apply clean dressing.   - Do not get the sutures wet for long periods of time.   - Return here or go to the ER immediately with any signs of infection (increased redness, increased swelling, increased pain, pus drainage, or fever)  - Return here or another medical facility for suture removal in (10-14) days.

## 2024-01-16 ENCOUNTER — HOSPITAL ENCOUNTER (EMERGENCY)
Facility: HOSPITAL | Age: 29
Discharge: HOME | End: 2024-01-16
Attending: EMERGENCY MEDICINE
Payer: COMMERCIAL

## 2024-01-16 ENCOUNTER — APPOINTMENT (EMERGENCY)
Dept: ULTRASOUND IMAGING | Facility: HOSPITAL | Age: 29
End: 2024-01-16
Payer: COMMERCIAL

## 2024-01-16 VITALS
DIASTOLIC BLOOD PRESSURE: 96 MMHG | RESPIRATION RATE: 16 BRPM | OXYGEN SATURATION: 98 % | HEART RATE: 75 BPM | SYSTOLIC BLOOD PRESSURE: 137 MMHG

## 2024-01-16 DIAGNOSIS — N93.9 VAGINAL BLEEDING: Primary | ICD-10-CM

## 2024-01-16 DIAGNOSIS — N84.0 ENDOMETRIAL POLYP: ICD-10-CM

## 2024-01-16 LAB
ALBUMIN SERPL BCP-MCNC: 4.9 G/DL (ref 3.5–5)
ALP SERPL-CCNC: 46 U/L (ref 34–104)
ALT SERPL W P-5'-P-CCNC: 113 U/L (ref 7–52)
ANION GAP SERPL CALCULATED.3IONS-SCNC: 11 MMOL/L
AST SERPL W P-5'-P-CCNC: 76 U/L (ref 13–39)
BASOPHILS # BLD AUTO: 0.03 THOUSANDS/ÂΜL (ref 0–0.1)
BASOPHILS NFR BLD AUTO: 1 % (ref 0–1)
BILIRUB SERPL-MCNC: 1.08 MG/DL (ref 0.2–1)
BUN SERPL-MCNC: 7 MG/DL (ref 5–25)
CALCIUM SERPL-MCNC: 9.8 MG/DL (ref 8.4–10.2)
CHLORIDE SERPL-SCNC: 103 MMOL/L (ref 96–108)
CO2 SERPL-SCNC: 26 MMOL/L (ref 21–32)
CREAT SERPL-MCNC: 0.61 MG/DL (ref 0.6–1.3)
EOSINOPHIL # BLD AUTO: 0.02 THOUSAND/ÂΜL (ref 0–0.61)
EOSINOPHIL NFR BLD AUTO: 0 % (ref 0–6)
ERYTHROCYTE [DISTWIDTH] IN BLOOD BY AUTOMATED COUNT: 13.1 % (ref 11.6–15.1)
GFR SERPL CREATININE-BSD FRML MDRD: 124 ML/MIN/1.73SQ M
GLUCOSE SERPL-MCNC: 89 MG/DL (ref 65–140)
HCG SERPL QL: NEGATIVE
HCT VFR BLD AUTO: 41.1 % (ref 34.8–46.1)
HGB BLD-MCNC: 13.9 G/DL (ref 11.5–15.4)
IMM GRANULOCYTES # BLD AUTO: 0.01 THOUSAND/UL (ref 0–0.2)
IMM GRANULOCYTES NFR BLD AUTO: 0 % (ref 0–2)
LYMPHOCYTES # BLD AUTO: 0.67 THOUSANDS/ÂΜL (ref 0.6–4.47)
LYMPHOCYTES NFR BLD AUTO: 14 % (ref 14–44)
MAGNESIUM SERPL-MCNC: 1.8 MG/DL (ref 1.9–2.7)
MCH RBC QN AUTO: 33.9 PG (ref 26.8–34.3)
MCHC RBC AUTO-ENTMCNC: 33.8 G/DL (ref 31.4–37.4)
MCV RBC AUTO: 100 FL (ref 82–98)
MONOCYTES # BLD AUTO: 0.46 THOUSAND/ÂΜL (ref 0.17–1.22)
MONOCYTES NFR BLD AUTO: 10 % (ref 4–12)
NEUTROPHILS # BLD AUTO: 3.61 THOUSANDS/ÂΜL (ref 1.85–7.62)
NEUTS SEG NFR BLD AUTO: 75 % (ref 43–75)
NRBC BLD AUTO-RTO: 0 /100 WBCS
PLATELET # BLD AUTO: 161 THOUSANDS/UL (ref 149–390)
PMV BLD AUTO: 9.6 FL (ref 8.9–12.7)
POTASSIUM SERPL-SCNC: 3.6 MMOL/L (ref 3.5–5.3)
PROT SERPL-MCNC: 7.6 G/DL (ref 6.4–8.4)
RBC # BLD AUTO: 4.1 MILLION/UL (ref 3.81–5.12)
SODIUM SERPL-SCNC: 140 MMOL/L (ref 135–147)
WBC # BLD AUTO: 4.8 THOUSAND/UL (ref 4.31–10.16)

## 2024-01-16 PROCEDURE — 93005 ELECTROCARDIOGRAM TRACING: CPT

## 2024-01-16 PROCEDURE — 83735 ASSAY OF MAGNESIUM: CPT

## 2024-01-16 PROCEDURE — 99284 EMERGENCY DEPT VISIT MOD MDM: CPT

## 2024-01-16 PROCEDURE — 80053 COMPREHEN METABOLIC PANEL: CPT

## 2024-01-16 PROCEDURE — 96361 HYDRATE IV INFUSION ADD-ON: CPT

## 2024-01-16 PROCEDURE — 84703 CHORIONIC GONADOTROPIN ASSAY: CPT

## 2024-01-16 PROCEDURE — 36415 COLL VENOUS BLD VENIPUNCTURE: CPT

## 2024-01-16 PROCEDURE — 76856 US EXAM PELVIC COMPLETE: CPT

## 2024-01-16 PROCEDURE — 99285 EMERGENCY DEPT VISIT HI MDM: CPT

## 2024-01-16 PROCEDURE — 96360 HYDRATION IV INFUSION INIT: CPT

## 2024-01-16 PROCEDURE — 85025 COMPLETE CBC W/AUTO DIFF WBC: CPT

## 2024-01-16 RX ADMIN — SODIUM CHLORIDE 1000 ML: 0.9 INJECTION, SOLUTION INTRAVENOUS at 17:15

## 2024-01-16 NOTE — ED PROVIDER NOTES
History  Chief Complaint   Patient presents with    Vaginal Bleeding     Pt c/o vaginal bleeding x 1 week with worsening in bleeding x 2 days. Pt states going through tampons every 15 min and clothes. Pt states vaginal delivery in August, first period since delivery was in Dec. Pt had 4 previous pregnancies in total, no bleeding complications with previous pregnancies. Pt also c.o x 2 weeks RLQ abd pain.      Patient is a 28-year-old female with a past medical history of anemia, anorexia and asthma presenting to the emergency department evaluation of vaginal bleeding.  Patient states she has had vaginal bleeding for the past week.  Patient states over the past 2 days it has worsened.  Patient states she is going to a regular tampon approximately 15 minutes.  Patient states she did have a vaginal delivery in August, states her first period after delivery was December 20.  Patient states her period lasted approximately 5 days.  Patient states she did take 2 home pregnancy tests in the beginning of January that were positive, reports getting a blood test after that was negative.  Patient reports she did have right-sided lower abdominal pain for the past 2 weeks intermittently.  Patient states approximate 1 hour ago began having lower abdominal cramping.  Patient states she typically does get cramping with her menstrual cycles.  G4, P4.  Reports she discussed with her OB/GYN and was advised to come to the emergency department for further evaluation.  Patient does report feeling weak for the past week.  Patient offers no other concerns or complaints at this time.        Prior to Admission Medications   Prescriptions Last Dose Informant Patient Reported? Taking?   albuterol (PROVENTIL HFA,VENTOLIN HFA) 90 mcg/act inhaler   Yes No   Sig: Inhale 2 puffs every 6 (six) hours as needed      Facility-Administered Medications: None       Past Medical History:   Diagnosis Date    Anemia     Anorexia     Asthma        Past  Surgical History:   Procedure Laterality Date    TONSILLECTOMY         No family history on file.  I have reviewed and agree with the history as documented.    E-Cigarette/Vaping    E-Cigarette Use Never User      E-Cigarette/Vaping Substances    Nicotine No     THC No     CBD No     Flavoring No     Other No     Unknown No      Social History     Tobacco Use    Smoking status: Never    Smokeless tobacco: Never   Vaping Use    Vaping status: Never Used   Substance Use Topics    Alcohol use: No    Drug use: No       Review of Systems   Constitutional:  Negative for chills and fever.   HENT:  Negative for ear pain and sore throat.    Eyes:  Negative for pain and visual disturbance.   Respiratory:  Negative for cough and shortness of breath.    Cardiovascular:  Negative for chest pain and palpitations.   Gastrointestinal:  Positive for abdominal pain. Negative for constipation, diarrhea, nausea and vomiting.   Genitourinary:  Positive for vaginal bleeding. Negative for dysuria and hematuria.   Musculoskeletal:  Negative for arthralgias and back pain.   Skin:  Negative for color change and rash.   Neurological:  Positive for weakness. Negative for seizures and syncope.   All other systems reviewed and are negative.      Physical Exam  Physical Exam  Vitals and nursing note reviewed.   Constitutional:       General: She is not in acute distress.     Appearance: Normal appearance. She is not toxic-appearing or diaphoretic.   HENT:      Head: Normocephalic and atraumatic.      Right Ear: External ear normal.      Left Ear: External ear normal.      Nose: Nose normal.      Mouth/Throat:      Mouth: Mucous membranes are moist.   Eyes:      General: No scleral icterus.        Right eye: No discharge.         Left eye: No discharge.      Conjunctiva/sclera: Conjunctivae normal.   Pulmonary:      Effort: Pulmonary effort is normal. No respiratory distress.   Abdominal:      Comments: No abdominal tenderness on exam.  Abdomen is  soft without guarding or rebound.   Musculoskeletal:         General: No swelling, deformity or signs of injury. Normal range of motion.      Cervical back: Normal range of motion and neck supple. No rigidity.   Skin:     General: Skin is warm and dry.      Coloration: Skin is not jaundiced.      Findings: No erythema or rash.   Neurological:      General: No focal deficit present.      Mental Status: She is alert and oriented to person, place, and time. Mental status is at baseline.      Cranial Nerves: No cranial nerve deficit.      Gait: Gait normal.   Psychiatric:         Mood and Affect: Mood normal.         Behavior: Behavior normal.         Thought Content: Thought content normal.         Judgment: Judgment normal.         Vital Signs  ED Triage Vitals   Temp Pulse Respirations Blood Pressure SpO2   -- 01/16/24 1647 01/16/24 1647 01/16/24 1647 01/16/24 1647    83 16 127/77 100 %      Temp src Heart Rate Source Patient Position - Orthostatic VS BP Location FiO2 (%)   -- 01/16/24 1647 01/16/24 1647 01/16/24 1647 --    Monitor Sitting Right arm       Pain Score       01/16/24 2052       No Pain           Vitals:    01/16/24 1647 01/16/24 1800 01/16/24 1900 01/16/24 2052   BP: 127/77 130/79 124/79 137/96   Pulse: 83 88 76 75   Patient Position - Orthostatic VS: Sitting Sitting  Sitting         Visual Acuity      ED Medications  Medications   sodium chloride 0.9 % bolus 1,000 mL (0 mL Intravenous Stopped 1/16/24 2119)       Diagnostic Studies  Results Reviewed       Procedure Component Value Units Date/Time    hCG, qualitative pregnancy [[id number]]  (Normal) Collected: 01/16/24 1718    Lab Status: Final result Specimen: Blood from Arm, Right Updated: 01/16/24 1756     Preg, Serum Negative    Comprehensive metabolic panel [[id number]]  (Abnormal) Collected: 01/16/24 1718    Lab Status: Final result Specimen: Blood from Arm, Right Updated: 01/16/24 1746     Sodium 140 mmol/L      Potassium 3.6 mmol/L      Chloride  103 mmol/L      CO2 26 mmol/L      ANION GAP 11 mmol/L      BUN 7 mg/dL      Creatinine 0.61 mg/dL      Glucose 89 mg/dL      Calcium 9.8 mg/dL      AST 76 U/L       U/L      Alkaline Phosphatase 46 U/L      Total Protein 7.6 g/dL      Albumin 4.9 g/dL      Total Bilirubin 1.08 mg/dL      eGFR 124 ml/min/1.73sq m     Narrative:      National Kidney Disease Foundation guidelines for Chronic Kidney Disease (CKD):     Stage 1 with normal or high GFR (GFR > 90 mL/min/1.73 square meters)    Stage 2 Mild CKD (GFR = 60-89 mL/min/1.73 square meters)    Stage 3A Moderate CKD (GFR = 45-59 mL/min/1.73 square meters)    Stage 3B Moderate CKD (GFR = 30-44 mL/min/1.73 square meters)    Stage 4 Severe CKD (GFR = 15-29 mL/min/1.73 square meters)    Stage 5 End Stage CKD (GFR <15 mL/min/1.73 square meters)  Note: GFR calculation is accurate only with a steady state creatinine    Magnesium [[id number]]  (Abnormal) Collected: 01/16/24 1718    Lab Status: Final result Specimen: Blood from Arm, Right Updated: 01/16/24 1746     Magnesium 1.8 mg/dL     CBC and differential [[id number]]  (Abnormal) Collected: 01/16/24 1718    Lab Status: Final result Specimen: Blood from Arm, Right Updated: 01/16/24 1729     WBC 4.80 Thousand/uL      RBC 4.10 Million/uL      Hemoglobin 13.9 g/dL      Hematocrit 41.1 %       fL      MCH 33.9 pg      MCHC 33.8 g/dL      RDW 13.1 %      MPV 9.6 fL      Platelets 161 Thousands/uL      nRBC 0 /100 WBCs      Neutrophils Relative 75 %      Immat GRANS % 0 %      Lymphocytes Relative 14 %      Monocytes Relative 10 %      Eosinophils Relative 0 %      Basophils Relative 1 %      Neutrophils Absolute 3.61 Thousands/µL      Immature Grans Absolute 0.01 Thousand/uL      Lymphocytes Absolute 0.67 Thousands/µL      Monocytes Absolute 0.46 Thousand/µL      Eosinophils Absolute 0.02 Thousand/µL      Basophils Absolute 0.03 Thousands/µL                    US pelvis complete non OB   Final Result by King  MD Joseph (01/16 1943)      7 mm hyperechoic focus within the fundal endometrium, possibly reflecting a small endometrial polyp versus artifact. Follow-up ultrasound may be performed to assess for persistence. Alternatively, hysteroscopy could be performed if clinically indicated    for further evaluation.      Normal sonographic appearance of the ovaries.                              Workstation performed: [id number]                    Procedures  Procedures         ED Course  ED Course as of 01/16/24 2244   Tue Jan 16, 2024   1733 Hemoglobin: 13.9   1809 PREGNANCY, SERUM: Negative   2047 Will walk the patient with a new pad on an access if it is saturated.    2106 Patient ambulated and had 2 drops of blood on her new pad. This was discussed with OB, outpatient follow up recommended             SBIRT 20yo+      Flowsheet Row Most Recent Value   Initial Alcohol Screen: US AUDIT-C     1. How often do you have a drink containing alcohol? 3 Filed at: 01/16/2024 1646   2. How many drinks containing alcohol do you have on a typical day you are drinking?  2 Filed at: 01/16/2024 1646   3a. Male UNDER 65: How often do you have five or more drinks on one occasion? 0 Filed at: 01/16/2024 1646   3b. FEMALE Any Age, or MALE 65+: How often do you have 4 or more drinks on one occassion? 0 Filed at: 01/16/2024 1646   Audit-C Score 5 Filed at: 01/16/2024 1646   TUYET: How many times in the past year have you...    Used an illegal drug or used a prescription medication for non-medical reasons? Never Filed at: 01/16/2024 1646                      Medical Decision Making    This is a 28 female presenting to the emergency department for evaluation of vaginal bleeding.  Patient states she has had vaginal bleeding for the past week.  Patient states she thought it was like her menstrual cycle but states over the past 2 days it has gotten heavier.  Patient states she is going through regular tampon every 15 minutes.  Patient was advised by  her OB/GYN to be evaluated.  Patient does report having right lower quadrant abdominal pain intermittent for the past 2 weeks and states having lower abdominal cramping for the past hour.  Patient denies pregnancy, states she had 2 positive at-home pregnancies at the beginning of January with a negative blood test.  Patient is in no acute distress, stable vital signs.    Differential diagnosis to include but is not limited to: Abnormal uterine bleeding, ovarian cyst, anemia, polyp    Initial ED Plan: imaging, labs    ED results:  Hgb: 13.9  7 mm hyperechoic focus within the fundal endometrium, possibly reflecting a small endometrial polyp versus artifact. Follow-up ultrasound may be performed to assess for persistence. Alternatively, hysteroscopy could be performed if clinically indicated   for further evaluation.    Patient has changed her pad twice, states they were not saturated.  Patient reports her bleeding is worse when moving and ambulating.    Patient was ambulated with a new pad on, 2 spots of blood were on the pad.   This was discussed with OB on call, Dr. Torres, recommending outpatient follow up.    Final ED assessment: Patient is stable and well appearing. Discussed radiologic studies and laboratory results. Discussed follow up with PCP and OBGYN. Strict return precautions were discussed including but not limited to worsening bleeding, pain, weakness. Patient verbalized understanding and is agreeable with the plan for discharge.     Amount and/or Complexity of Data Reviewed  Labs: ordered. Decision-making details documented in ED Course.  Radiology: ordered.             Disposition  Final diagnoses:   Vaginal bleeding   Endometrial polyp     Time reflects when diagnosis was documented in both MDM as applicable and the Disposition within this note       Time User Action Codes Description Comment    1/16/2024  9:11 PM Mabel Fall Add [N93.9] Vaginal bleeding     1/16/2024  9:11 PM Mabel Fall Add  [N84.0] Endometrial polyp           ED Disposition       ED Disposition   Discharge    Condition   Stable    Date/Time   Tue Jan 16, 2024 2111    Comment   Kacey Saunders discharge to home/self care.                   Follow-up Information       Follow up With Specialties Details Why Contact Info Additional Information    Daniele Magana,  Family Medicine Call in 3 days For follow up [address]  [place] [address]  [phone]       [facility] Emergency Department Emergency Medicine Go to  If symptoms worsen [address] [facility] [address] [phone] [facility] Emergency Department, [address] [place], [place], [address]    [facility] Obstetrics & Gynecology [facility] Obstetrics and Gynecology Call in 3 days For follow up [address] [facility] [id number] [phone] [facility] Obstetrics  Gynecology [facility], [address] [place] [id number] [phone]            Discharge Medication List as of 1/16/2024  9:12 PM        CONTINUE these medications which have NOT CHANGED    Details   albuterol (PROVENTIL HFA,VENTOLIN HFA) 90 mcg/act inhaler Inhale 2 puffs every 6 (six) hours as needed, Starting Sat 5/20/2023, Historical Med             No discharge procedures on file.    PDMP Review       None            ED Provider  Electronically Signed by             Mabel Fall PA-C  01/16/24 8073

## 2024-01-17 LAB
ATRIAL RATE: 76 BPM
P AXIS: 75 DEGREES
PR INTERVAL: 172 MS
QRS AXIS: 55 DEGREES
QRSD INTERVAL: 94 MS
QT INTERVAL: 390 MS
QTC INTERVAL: 438 MS
T WAVE AXIS: 71 DEGREES
VENTRICULAR RATE: 76 BPM

## 2024-01-17 NOTE — DISCHARGE INSTRUCTIONS
Follow up with PCP  Follow up with OBGYN  Return to the ED with new or worsening symptoms including but not limited to worsening bleeding, pain, weakness    7 mm hyperechoic focus within the fundal endometrium, possibly reflecting a small endometrial polyp versus artifact.

## 2024-01-22 ENCOUNTER — OFFICE VISIT (OUTPATIENT)
Dept: URGENT CARE | Facility: CLINIC | Age: 29
End: 2024-01-22
Payer: COMMERCIAL

## 2024-01-22 VITALS
OXYGEN SATURATION: 98 % | TEMPERATURE: 97.8 F | SYSTOLIC BLOOD PRESSURE: 131 MMHG | HEART RATE: 79 BPM | RESPIRATION RATE: 18 BRPM | DIASTOLIC BLOOD PRESSURE: 85 MMHG

## 2024-01-22 DIAGNOSIS — Z48.02 ENCOUNTER FOR REMOVAL OF SUTURES: Primary | ICD-10-CM

## 2024-01-22 DIAGNOSIS — S51.812D LACERATION OF LEFT FOREARM, SUBSEQUENT ENCOUNTER: ICD-10-CM

## 2024-01-22 PROCEDURE — 99213 OFFICE O/P EST LOW 20 MIN: CPT | Performed by: PHYSICIAN ASSISTANT

## 2024-01-22 PROCEDURE — S9088 SERVICES PROVIDED IN URGENT: HCPCS | Performed by: PHYSICIAN ASSISTANT

## 2024-01-22 NOTE — PROGRESS NOTES
[facility]        NAME: Kacey Saunders is a 28 y.o. female  : 1995    MRN: [id number]  DATE: 2024  TIME: 5:57 PM      Assessment and Plan     Encounter for removal of sutures [Z48.02]  1. Encounter for removal of sutures        2. Laceration of left forearm, subsequent encounter              Patient Instructions   There are no Patient Instructions on file for this visit.     Follow up with PCP in 3-5 days.  Go to ER if symptoms worsen.    Chief Complaint     Chief Complaint   Patient presents with    Suture / Staple Removal     Pt here for suture removal on left fore arm. 2 sutures         History of Present Illness     Patient presents for suture removal from the laceration on her left forearm. She had 2 sutures placed here on 2024. She denies redness, swelling, pain, and discharge from the site.     Suture / Staple Removal        Review of Systems     Review of Systems   Constitutional:  Negative for chills, fatigue and fever.   HENT:  Negative for congestion, ear pain, postnasal drip, rhinorrhea, sinus pressure, sinus pain and sore throat.    Eyes:  Negative for pain and visual disturbance.   Respiratory:  Negative for cough, chest tightness and shortness of breath.    Cardiovascular:  Negative for chest pain and palpitations.   Gastrointestinal:  Negative for abdominal pain, diarrhea, nausea and vomiting.   Genitourinary:  Negative for dysuria and hematuria.   Musculoskeletal:  Negative for arthralgias, back pain and myalgias.   Skin:  Positive for wound. Negative for rash.   Neurological:  Negative for dizziness, seizures, syncope, numbness and headaches.   All other systems reviewed and are negative.        Current Medications       Current Outpatient Medications:     albuterol (PROVENTIL HFA,VENTOLIN HFA) 90 mcg/act inhaler, Inhale 2 puffs every 6 (six) hours as needed, Disp: , Rfl:     Current Allergies     Allergies as of 2024    (No Known Allergies)           "    The following portions of the patient's history were reviewed and updated as appropriate: allergies, current medications, past family history, past medical history, past social history, past surgical history, and problem list.     Past Medical History:   Diagnosis Date    Anemia     Anorexia     Asthma        Past Surgical History:   Procedure Laterality Date    TONSILLECTOMY         History reviewed. No pertinent family history.      Medications have been verified.        Objective     /85   Pulse 79   Temp 97.8 °F (36.6 °C) (Oral)   Resp 18   LMP 01/04/2024 (Approximate)   SpO2 98%   Patient's last menstrual period was 01/04/2024 (approximate).         Physical Exam     Physical Exam  Vitals and nursing note reviewed.   Constitutional:       Appearance: Normal appearance. She is normal weight.   Cardiovascular:      Rate and Rhythm: Normal rate.   Pulmonary:      Effort: Pulmonary effort is normal.   Skin:     General: Skin is warm and dry.      Comments: Left mid-anterior forearm with healed laceration with 2 simple interrupted sutures in place. No evidence of dehiscence, erythema, swelling, tenderness, or discharge from the site.    Neurological:      General: No focal deficit present.      Mental Status: She is alert and oriented to person, place, and time.   Psychiatric:         Mood and Affect: Mood normal.         Behavior: Behavior normal.       Suture removal    Date/Time: 1/22/2024 5:00 PM    Performed by: Stephanie Lees PA-C  Authorized by: Stephanie Lees PA-C  Universal Protocol:  Consent: Verbal consent obtained.  Risks and benefits: risks, benefits and alternatives were discussed  Consent given by: patient  Time out: Immediately prior to procedure a \"time out\" was called to verify the correct patient, procedure, equipment, support staff and site/side marked as required.  Patient understanding: patient states understanding of the procedure being performed  Patient consent: the " patient's understanding of the procedure matches consent given  Procedure consent: procedure consent matches procedure scheduled  Patient identity confirmed: verbally with patient      Patient location:  Clinic  Location:     Laterality:  Left    Location:  Upper extremity    Upper extremity location:  Arm    Arm location:  L lower arm  Procedure details:     Tools used:  Suture removal kit    Wound appearance:  No sign(s) of infection, good wound healing and clean    Number of sutures removed:  2  Post-procedure details:     Post-removal:  Antibiotic ointment applied and Band-Aid applied    Patient tolerance of procedure:  Tolerated well, no immediate complications

## 2024-03-24 ENCOUNTER — HOSPITAL ENCOUNTER (EMERGENCY)
Facility: HOSPITAL | Age: 29
Discharge: HOME | End: 2024-03-24
Attending: EMERGENCY MEDICINE | Admitting: EMERGENCY MEDICINE
Payer: COMMERCIAL

## 2024-03-24 VITALS
RESPIRATION RATE: 18 BRPM | DIASTOLIC BLOOD PRESSURE: 61 MMHG | HEART RATE: 66 BPM | TEMPERATURE: 98 F | SYSTOLIC BLOOD PRESSURE: 131 MMHG | OXYGEN SATURATION: 100 %

## 2024-03-24 DIAGNOSIS — R51.9 ACUTE NONINTRACTABLE HEADACHE, UNSPECIFIED HEADACHE TYPE: Primary | ICD-10-CM

## 2024-03-24 LAB
BILIRUB UR QL STRIP: NEGATIVE
CLARITY UR: CLEAR
COLOR UR: NORMAL
GLUCOSE UR STRIP-MCNC: NEGATIVE MG/DL
HGB UR QL STRIP.AUTO: NEGATIVE
KETONES UR STRIP-MCNC: NEGATIVE MG/DL
LEUKOCYTE ESTERASE UR QL STRIP: NEGATIVE
NITRITE UR QL STRIP: NEGATIVE
PH UR STRIP.AUTO: 6.5 [PH]
PROT UR STRIP-MCNC: NEGATIVE MG/DL
SP GR UR STRIP.AUTO: 1.01 (ref 1–1.03)
UROBILINOGEN UR STRIP-ACNC: <2 MG/DL

## 2024-03-24 PROCEDURE — 96365 THER/PROPH/DIAG IV INF INIT: CPT

## 2024-03-24 PROCEDURE — 81003 URINALYSIS AUTO W/O SCOPE: CPT | Performed by: EMERGENCY MEDICINE

## 2024-03-24 PROCEDURE — 96375 TX/PRO/DX INJ NEW DRUG ADDON: CPT

## 2024-03-24 PROCEDURE — 87086 URINE CULTURE/COLONY COUNT: CPT | Performed by: EMERGENCY MEDICINE

## 2024-03-24 PROCEDURE — 99284 EMERGENCY DEPT VISIT MOD MDM: CPT | Performed by: EMERGENCY MEDICINE

## 2024-03-24 PROCEDURE — 99283 EMERGENCY DEPT VISIT LOW MDM: CPT

## 2024-03-24 RX ORDER — DIPHENHYDRAMINE HYDROCHLORIDE 50 MG/ML
25 INJECTION INTRAMUSCULAR; INTRAVENOUS ONCE
Status: COMPLETED | OUTPATIENT
Start: 2024-03-24 | End: 2024-03-24

## 2024-03-24 RX ORDER — METOCLOPRAMIDE HYDROCHLORIDE 5 MG/ML
10 INJECTION INTRAMUSCULAR; INTRAVENOUS ONCE
Status: COMPLETED | OUTPATIENT
Start: 2024-03-24 | End: 2024-03-24

## 2024-03-24 RX ORDER — ACETAMINOPHEN 10 MG/ML
1000 INJECTION, SOLUTION INTRAVENOUS ONCE
Status: COMPLETED | OUTPATIENT
Start: 2024-03-24 | End: 2024-03-24

## 2024-03-24 RX ADMIN — ACETAMINOPHEN 1000 MG: 10 INJECTION INTRAVENOUS at 19:41

## 2024-03-24 RX ADMIN — METOCLOPRAMIDE 10 MG: 5 INJECTION, SOLUTION INTRAMUSCULAR; INTRAVENOUS at 18:23

## 2024-03-24 RX ADMIN — DIPHENHYDRAMINE HYDROCHLORIDE 25 MG: 50 INJECTION, SOLUTION INTRAMUSCULAR; INTRAVENOUS at 18:23

## 2024-03-24 NOTE — ED PROVIDER NOTES
Pt Name: Kacey Saunders  MRN: [id number]  Birthdate 1995  Age/Sex: 28 y.o. female  Date of evaluation: 3/24/2024  PCP: Daniele Magana DO    CHIEF COMPLAINT    Chief Complaint   Patient presents with    Migraine     Pt c/o migraine x 3 days, +nausea and photosensitivity. Pt reports being 9.5 weeks pregnant          HPI    28 y.o. female presenting with headache.  Patient states that she has had a headache over the past 3 days, gradually worsening over that time, dull, severe, throughout the head, worse with lights or noise and better at rest.  Patient also complains of some nausea but denies vomiting.  Patient notes a history of migraines that felt similar but st that ates this is more severe than her typical migraines.  Patient is pregnant at 9-1/2 weeks of gestation.  She denies numbness, weakness, fevers, trauma, chest pain, shortness of breath, other symptoms.  Patient took 2 extra strength Tylenol at 10 AM this morning with improvement but not resolution of her symptoms, states that symptoms have again worsened.      HPI      Past Medical and Surgical History    Past Medical History:   Diagnosis Date    Anemia     Anorexia     Asthma        Past Surgical History:   Procedure Laterality Date    TONSILLECTOMY         History reviewed. No pertinent family history.    Social History     Tobacco Use    Smoking status: Never    Smokeless tobacco: Never   Vaping Use    Vaping status: Never Used   Substance Use Topics    Alcohol use: No    Drug use: No           Allergies    No Known Allergies    Home Medications    Prior to Admission medications    Medication Sig Start Date End Date Taking? Authorizing Provider   albuterol (PROVENTIL HFA,VENTOLIN HFA) 90 mcg/act inhaler Inhale 2 puffs every 6 (six) hours as needed 5/20/23   Historical Provider, MD           Review of Systems    Review of Systems   Constitutional:  Negative for activity change, chills and fever.   HENT:  Negative for drooling and facial swelling.     Eyes:  Negative for pain, discharge and visual disturbance.   Respiratory:  Negative for apnea, cough, chest tightness, shortness of breath and wheezing.    Cardiovascular:  Negative for chest pain and leg swelling.   Gastrointestinal:  Negative for abdominal pain, constipation, diarrhea, nausea and vomiting.   Genitourinary:  Negative for difficulty urinating, dysuria and urgency.   Musculoskeletal:  Negative for arthralgias, back pain and gait problem.   Skin:  Negative for color change and rash.   Neurological:  Positive for headaches. Negative for dizziness, speech difficulty and weakness.   Psychiatric/Behavioral:  Negative for agitation, behavioral problems and confusion.            All other systems reviewed and negative.    Physical Exam      ED Triage Vitals [03/24/24 1734]   Temperature Pulse Respirations Blood Pressure SpO2   98 °F (36.7 °C) 77 16 121/86 98 %      Temp Source Heart Rate Source Patient Position - Orthostatic VS BP Location FiO2 (%)   Oral Monitor -- -- --      Pain Score       6               Physical Exam  Vitals and nursing note reviewed.   Constitutional:       General: She is not in acute distress.     Appearance: She is well-developed. She is not ill-appearing, toxic-appearing or diaphoretic.   HENT:      Head: Normocephalic and atraumatic.      Right Ear: External ear normal.      Left Ear: External ear normal.      Nose: Nose normal. No congestion or rhinorrhea.      Mouth/Throat:      Mouth: Mucous membranes are moist.      Pharynx: Oropharynx is clear. No oropharyngeal exudate or posterior oropharyngeal erythema.   Eyes:      Conjunctiva/sclera: Conjunctivae normal.      Pupils: Pupils are equal, round, and reactive to light.   Cardiovascular:      Rate and Rhythm: Normal rate and regular rhythm.      Pulses: Normal pulses.      Heart sounds: Normal heart sounds.   Pulmonary:      Effort: Pulmonary effort is normal. No respiratory distress.      Breath sounds: Normal breath  sounds. No wheezing or rales.   Abdominal:      Palpations: Abdomen is soft.      Tenderness: There is no abdominal tenderness. There is no guarding or rebound.   Musculoskeletal:         General: No deformity. Normal range of motion.      Cervical back: Normal range of motion and neck supple. No rigidity or tenderness.   Skin:     General: Skin is warm and dry.      Capillary Refill: Capillary refill takes less than 2 seconds.      Findings: No erythema or rash.   Neurological:      Mental Status: She is alert and oriented to person, place, and time.      Cranial Nerves: No cranial nerve deficit.      Sensory: No sensory deficit.      Motor: No weakness.      Coordination: Coordination normal.      Gait: Gait normal.      Comments: Cranial nerves II through XII intact, 5 out of 5 strength in all extremities, normal coordination, normal rhythm and prosody of speech, normal sensation   Psychiatric:         Behavior: Behavior normal.         Thought Content: Thought content normal.         Judgment: Judgment normal.              Diagnostic Results      Labs:    Results Reviewed       Procedure Component Value Units Date/Time    UA w Reflex to Microscopic w Reflex to Culture [[id number]] Collected: 03/24/24 1927    Lab Status: Final result Specimen: Urine, Clean Catch Updated: 03/24/24 1935     Color, UA Light Yellow     Clarity, UA Clear     Specific Gravity, UA 1.012     pH, UA 6.5     Leukocytes, UA Negative     Nitrite, UA Negative     Protein, UA Negative mg/dl      Glucose, UA Negative mg/dl      Ketones, UA Negative mg/dl      Urobilinogen, UA <2.0 mg/dl      Bilirubin, UA Negative     Occult Blood, UA Negative     URINE COMMENT --    Urine culture [[id number]] Collected: 03/24/24 1927    Lab Status: In process Specimen: Urine, Clean Catch Updated: 03/24/24 1935            All labs reviewed and utilized in the medical decision making process    Radiology:    No orders to display       All radiology studies  independently viewed by me and interpreted by the radiologist.    Procedure    Procedures        ED Course of Care and Re-Assessments      As per network protocol, discussed with OB/GYN on-call, agreed on plan of symptomatic treatment.    Medications   metoclopramide (REGLAN) injection 10 mg (10 mg Intravenous Given 3/24/24 1823)   diphenhydrAMINE (BENADRYL) injection 25 mg (25 mg Intravenous Given 3/24/24 1823)   acetaminophen (Ofirmev) injection 1,000 mg (0 mg Intravenous Stopped 3/24/24 2022)           FINAL IMPRESSION    Final diagnoses:   Acute nonintractable headache, unspecified headache type         DISPOSITION/PLAN    27 y/o f at 9.5 wks gestation with headache as above.  Vital signs reassuring, examination likewise reassuring with nonfocal neurologic exam.  Headache not maximal in onset,  no fever, no altered mental status.  Very low suspicion for sepsis, meningitis, encephalitis, intracranial hemorrhage, critically increased ICP, diffuse axonal injury, other acute life threat at this time.  Treated symptomatically with good response in emergency department.  Discharged with strict return precautions, follow up with primary care doctor.    Time reflects when diagnosis was documented in both MDM as applicable and the Disposition within this note       Time User Action Codes Description Comment    3/24/2024  8:26 PM Wes De Leon Add [R51.9] Acute nonintractable headache, unspecified headache type           ED Disposition       ED Disposition   Discharge    Condition   Stable    Date/Time   Sun Mar 24, 2024  8:26 PM    Comment   Kacey Saunders discharge to home/self care.                   Follow-up Information       Follow up With Specialties Details Why Contact Info Additional Information    [facility] Emergency Department Emergency Medicine Go to  If symptoms worsen [address] [facility] [id number] [phone] [facility] Emergency  "Department, [address] [place], [place], [address]    Daniele Magana DO Family Medicine Call in 1 day To discuss this visit and schedule close follow-up [address]  [place] [address] [phone]                 PATIENT REFERRED TO:    [facility] Emergency Department  [address] [facility] [id number] [phone]  Go to   If symptoms worsen    Daniele Magana DO  [address]  [place] [address] [phone]    Call in 1 day  To discuss this visit and schedule close follow-up      DISCHARGE MEDICATIONS:    Patient's Medications   Discharge Prescriptions    No medications on file       No discharge procedures on file.         Wes De Leon MD    Portions of the record may have been created with voice recognition software.  Occasional wrong word or \"sound alike\" substitutions may have occurred due to the inherent limitations of voice recognition software.  Please read the chart carefully and recognize, using context, where substitutions have occurred     Wes De Leon MD  03/24/24 8158    "

## 2024-03-26 LAB — BACTERIA UR CULT: NORMAL

## 2024-04-05 ENCOUNTER — APPOINTMENT (OUTPATIENT)
Dept: URGENT CARE | Facility: CLINIC | Age: 29
End: 2024-04-05
Payer: COMMERCIAL

## 2024-04-05 ENCOUNTER — HOSPITAL ENCOUNTER (EMERGENCY)
Facility: HOSPITAL | Age: 29
Discharge: HOME | End: 2024-04-05
Attending: EMERGENCY MEDICINE
Payer: COMMERCIAL

## 2024-04-05 ENCOUNTER — OFFICE VISIT (OUTPATIENT)
Dept: URGENT CARE | Facility: CLINIC | Age: 29
End: 2024-04-05
Payer: COMMERCIAL

## 2024-04-05 VITALS
SYSTOLIC BLOOD PRESSURE: 128 MMHG | HEART RATE: 60 BPM | DIASTOLIC BLOOD PRESSURE: 96 MMHG | OXYGEN SATURATION: 99 % | RESPIRATION RATE: 16 BRPM | TEMPERATURE: 96.4 F

## 2024-04-05 VITALS
OXYGEN SATURATION: 100 % | HEART RATE: 80 BPM | DIASTOLIC BLOOD PRESSURE: 88 MMHG | RESPIRATION RATE: 18 BRPM | SYSTOLIC BLOOD PRESSURE: 137 MMHG | TEMPERATURE: 98.6 F

## 2024-04-05 DIAGNOSIS — K08.89 PAIN, DENTAL: Primary | ICD-10-CM

## 2024-04-05 DIAGNOSIS — Z34.91 FIRST TRIMESTER PREGNANCY: ICD-10-CM

## 2024-04-05 PROCEDURE — 99213 OFFICE O/P EST LOW 20 MIN: CPT | Performed by: PHYSICIAN ASSISTANT

## 2024-04-05 PROCEDURE — S9088 SERVICES PROVIDED IN URGENT: HCPCS | Performed by: PHYSICIAN ASSISTANT

## 2024-04-05 PROCEDURE — 99284 EMERGENCY DEPT VISIT MOD MDM: CPT | Performed by: EMERGENCY MEDICINE

## 2024-04-05 PROCEDURE — 99282 EMERGENCY DEPT VISIT SF MDM: CPT

## 2024-04-05 RX ORDER — LIDOCAINE HYDROCHLORIDE 10 MG/ML
5 INJECTION, SOLUTION EPIDURAL; INFILTRATION; INTRACAUDAL; PERINEURAL ONCE
Status: COMPLETED | OUTPATIENT
Start: 2024-04-05 | End: 2024-04-05

## 2024-04-05 RX ORDER — AMOXICILLIN 500 MG/1
CAPSULE ORAL
COMMUNITY
Start: 2024-04-05

## 2024-04-05 RX ADMIN — LIDOCAINE HYDROCHLORIDE 5 ML: 10 INJECTION, SOLUTION EPIDURAL; INFILTRATION; INTRACAUDAL; PERINEURAL at 15:50

## 2024-04-05 NOTE — ED PROVIDER NOTES
History  Chief Complaint   Patient presents with    Dental Pain     Pt started with dental pain yesterday on the left upper side. Pt is 11 weeks pregnant and states that she normally has issues with her teeth when pregnant.      28-year-old female history of asthma presenting with dental pain.  Patient reports that she is 11 weeks pregnant and began having dental pain yesterday.  Has been taking Tylenol with minimal improvement.  Reports she has previously had tooth pain with previous pregnancies.  Denies any dental injury or trauma or any broken teeth.  Mainly complaining of pain above her front teeth.  Denies any chest pain shortness of breath.  Denies any abdominal pain nausea vomiting diarrhea.  Denies any other complaints.  Chart reviewed.    Past Medical History:  No date: Anemia  No date: Anorexia  No date: Asthma  Family History: non-contributory  Social History          Prior to Admission Medications   Prescriptions Last Dose Informant Patient Reported? Taking?   albuterol (PROVENTIL HFA,VENTOLIN HFA) 90 mcg/act inhaler   Yes No   Sig: Inhale 2 puffs every 6 (six) hours as needed   amoxicillin (AMOXIL) 500 mg capsule   Yes No      Facility-Administered Medications: None       Past Medical History:   Diagnosis Date    Anemia     Anorexia     Asthma        Past Surgical History:   Procedure Laterality Date    TONSILLECTOMY         History reviewed. No pertinent family history.  I have reviewed and agree with the history as documented.    E-Cigarette/Vaping    E-Cigarette Use Never User      E-Cigarette/Vaping Substances    Nicotine No     THC No     CBD No     Flavoring No     Other No     Unknown No      Social History     Tobacco Use    Smoking status: Never    Smokeless tobacco: Never   Vaping Use    Vaping status: Never Used   Substance Use Topics    Alcohol use: No    Drug use: No       Review of Systems   Constitutional:  Negative for appetite change, chills, diaphoresis, fever and unexpected weight  change.   HENT:  Positive for dental problem. Negative for congestion and rhinorrhea.    Eyes:  Negative for photophobia and visual disturbance.   Respiratory:  Negative for cough, chest tightness and shortness of breath.    Cardiovascular:  Negative for chest pain, palpitations and leg swelling.   Gastrointestinal:  Negative for abdominal distention, abdominal pain, blood in stool, constipation, diarrhea, nausea and vomiting.   Genitourinary:  Negative for dysuria and hematuria.   Musculoskeletal:  Negative for back pain, joint swelling, neck pain and neck stiffness.   Skin:  Negative for color change, pallor, rash and wound.   Neurological:  Negative for dizziness, syncope, weakness, light-headedness and headaches.   Psychiatric/Behavioral:  Negative for agitation.    All other systems reviewed and are negative.      Physical Exam  Physical Exam  Vitals and nursing note reviewed.   Constitutional:       General: She is not in acute distress.     Appearance: Normal appearance. She is well-developed. She is not ill-appearing, toxic-appearing or diaphoretic.   HENT:      Head: Normocephalic and atraumatic.      Nose: Nose normal. No congestion or rhinorrhea.      Mouth/Throat:      Mouth: Mucous membranes are moist.      Pharynx: Oropharynx is clear. No oropharyngeal exudate or posterior oropharyngeal erythema.   Eyes:      General: No scleral icterus.        Right eye: No discharge.         Left eye: No discharge.      Extraocular Movements: Extraocular movements intact.      Conjunctiva/sclera: Conjunctivae normal.      Pupils: Pupils are equal, round, and reactive to light.   Neck:      Vascular: No JVD.      Trachea: No tracheal deviation.      Comments: Supple. Normal range of motion.   Cardiovascular:      Rate and Rhythm: Normal rate and regular rhythm.      Heart sounds: Normal heart sounds. No murmur heard.     No friction rub. No gallop.      Comments: Normal rate and regular rhythm  Pulmonary:      Effort:  Pulmonary effort is normal. No respiratory distress.      Breath sounds: Normal breath sounds. No stridor. No wheezing or rales.      Comments: Clear to auscultation bilaterally  Chest:      Chest wall: No tenderness.   Abdominal:      General: Bowel sounds are normal. There is no distension.      Palpations: Abdomen is soft.      Tenderness: There is no abdominal tenderness. There is no right CVA tenderness, left CVA tenderness, guarding or rebound.      Comments: Soft, nontender, nondistended.  Normal bowel sounds throughout   Musculoskeletal:         General: No swelling, tenderness, deformity or signs of injury. Normal range of motion.      Cervical back: Normal range of motion and neck supple. No rigidity. No muscular tenderness.      Right lower leg: No edema.      Left lower leg: No edema.   Lymphadenopathy:      Cervical: No cervical adenopathy.   Skin:     General: Skin is warm and dry.      Coloration: Skin is not pale.      Findings: No erythema or rash.   Neurological:      General: No focal deficit present.      Mental Status: She is alert. Mental status is at baseline.      Sensory: No sensory deficit.      Motor: No weakness or abnormal muscle tone.      Coordination: Coordination normal.      Gait: Gait normal.      Comments: Alert.  Strength and sensation grossly intact.  Ambulatory without difficulty at baseline.    Psychiatric:         Behavior: Behavior normal.         Thought Content: Thought content normal.         Vital Signs  ED Triage Vitals [04/05/24 1521]   Temperature Pulse Respirations Blood Pressure SpO2   98.6 °F (37 °C) 80 18 137/88 100 %      Temp Source Heart Rate Source Patient Position - Orthostatic VS BP Location FiO2 (%)   Temporal Monitor Sitting Left arm --      Pain Score       --           Vitals:    04/05/24 1521   BP: 137/88   Pulse: 80   Patient Position - Orthostatic VS: Sitting         Visual Acuity      ED Medications  Medications   lidocaine (PF) (XYLOCAINE-MPF) 1 %  "injection 5 mL (5 mL Infiltration Given by Other 4/5/24 1123)       Diagnostic Studies  Results Reviewed       None                   No orders to display              Procedures  Nerve block    Date/Time: 4/5/2024 4:05 PM    Performed by: Neto Dorado MD  Authorized by: Neto Dorado MD    Patient location:  ED  Ernul Protocol:  Consent: Verbal consent obtained.  Risks and benefits: risks, benefits and alternatives were discussed  Consent given by: patient  Time out: Immediately prior to procedure a \"time out\" was called to verify the correct patient, procedure, equipment, support staff and site/side marked as required.  Timeout called at: 4/5/2024 3:45 PM.  Patient understanding: patient states understanding of the procedure being performed  Patient consent: the patient's understanding of the procedure matches consent given  Procedure consent: procedure consent matches procedure scheduled  Relevant documents: relevant documents present and verified  Test results: test results available and properly labeled  Site marked: the operative site was marked  Radiology Images displayed and confirmed. If images not available, report reviewed: imaging studies available  Required items: required blood products, implants, devices, and special equipment available  Patient identity confirmed: verbally with patient and arm band    Indications:     Indications:  Pain relief  Location:     Body area:  Head    Head nerve blocked: Superior alveolar.    Nerve type:  Peripheral    Laterality:  Left  Skin anesthesia (see MAR for exact dosages):     Skin anesthesia method:  None  Procedure details (see MAR for exact dosages):     Block needle gauge:  25 G    Anesthetic injected:  Lidocaine 1% w/o epi    Steroid injected:  None    Additive injected:  None    Injection procedure:  Anatomic landmarks identified, anatomic landmarks palpated, introduced needle, incremental injection and negative aspiration for blood  Post-procedure " details:     Dressing:  None    Outcome:  Pain improved    Patient tolerance of procedure:  Tolerated well, no immediate complications           ED Course                               SBIRT 22yo+      Flowsheet Row Most Recent Value   Initial Alcohol Screen: US AUDIT-C     1. How often do you have a drink containing alcohol? 0 Filed at: 04/05/2024 1523   2. How many drinks containing alcohol do you have on a typical day you are drinking?  0 Filed at: 04/05/2024 1523   3b. FEMALE Any Age, or MALE 65+: How often do you have 4 or more drinks on one occassion? 0 Filed at: 04/05/2024 1523   Audit-C Score 0 Filed at: 04/05/2024 1523   TUYET: How many times in the past year have you...    Used an illegal drug or used a prescription medication for non-medical reasons? Never Filed at: 04/05/2024 1523                      Medical Decision Making  28-year-old female history of asthma presenting with dental pain.  No swelling or infectious changes appreciable on exam.  Patient just received prescription for antibiotics.  Shared decision-making with patient about pain management and limitations of pain medication and pregnancy.  Patient opting for lidocaine injection.  Symptoms improved with medication.  Advised to continue taking amoxicillin.  Tylenol as needed for pain. Discussed results and recommendations. Advised follow up PCP and dentist. Medication recommendations. Given instructions and return precautions. Patient/family at bedside acknowledged understanding of all written and verbal instructions and return precautions. Discharged.     Risk  Prescription drug management.             Disposition  Final diagnoses:   Pain, dental   First trimester pregnancy     Time reflects when diagnosis was documented in both MDM as applicable and the Disposition within this note       Time User Action Codes Description Comment    4/5/2024  4:04 PM Neto Dorado Add [K08.89] Pain, dental     4/5/2024  4:04 PM Neto Dorado Add [Z34.91] First  trimester pregnancy           ED Disposition       ED Disposition   Discharge    Condition   Stable    Date/Time   Fri Apr 5, 2024 1604    Comment   Kacey Saunders discharge to home/self care.                   Follow-up Information       Follow up With Specialties Details Why Contact Info    Daniele Magana,  Family Medicine Schedule an appointment as soon as possible for a visit in 1 week  [address]  [place] [id number] [phone]      [facility]  Schedule an appointment as soon as possible for a visit in 3 days  [address] [place] [address] [address]  [facility] [address] [phone]            Patient's Medications   Discharge Prescriptions    No medications on file       No discharge procedures on file.    PDMP Review       None            ED Provider  Electronically Signed by             Neto Dorado MD  04/05/24 1608       Neto Dorado MD  04/05/24 5674

## 2024-04-05 NOTE — PROGRESS NOTES
[facility]        NAME: Kacey Saunders is a 28 y.o. female  : 1995    MRN: [id number]  DATE: 2024  TIME: 2:48 PM    Assessment and Plan   Pain, dental [K08.89]  1. Pain, dental              Patient Instructions   Discussed with patient that amoxicillin is the proper antibiotic for dental infection.  Advised that she continue Tylenol and try the Orajel over-the-counter substance to help with discomfort.  We discussed that patient cannot have most pain medication due to pregnancy and that any further pain medication recommendations will need to come from her OB/GYN.  Patient notes she thinks she will proceed to the ER for further pain control.  If tests have been performed at [facility], our office will contact you with results if changes need to be made to the care plan discussed with you at the visit.  You can review your full results on [facility]  Follow up with PCP in 3-5 days.  Proceed to  ER if symptoms worsen.    Chief Complaint     Chief Complaint   Patient presents with    Dental Pain     LEFT SIDE, GUM PAIN, STARTED YESTERDAY, 11 WEEKS PREGNANT. STARTED AMOXICILLIAN 2 DAYS AGO         History of Present Illness       HPI  This is a 28-year-old female here complaining of upper left dental pain since yesterday morning.  Patient notes that the pain is excruciating and the Tylenol she is taking is not adequate.  She last took Tylenol at 10 AM.  Patient is currently 11 weeks pregnant.  She notes that she spoke with her family doctor yesterday and was prescribed amoxicillin which she started yesterday.  She notes she has an appoint with the dentist next Wednesday.  She denies fever, vomiting or diarrhea, shortness of breath or crushing chest pain.  Will be told her to come here for pain control.  However upon further review of the chart it appears that the prescription for amoxicillin was only placed today by the PCP she was also given a prescription for peridex patient did not  notes she was taking.  Review of Systems   Review of Systems   Constitutional:  Negative for fever.   HENT:  Positive for dental problem.    Respiratory:  Negative for shortness of breath.    Cardiovascular:  Negative for chest pain.   Gastrointestinal:  Negative for diarrhea and vomiting.         Current Medications       Current Outpatient Medications:     albuterol (PROVENTIL HFA,VENTOLIN HFA) 90 mcg/act inhaler, Inhale 2 puffs every 6 (six) hours as needed, Disp: , Rfl:     amoxicillin (AMOXIL) 500 mg capsule, , Disp: , Rfl:     Current Allergies     Allergies as of 04/05/2024    (No Known Allergies)            The following portions of the patient's history were reviewed and updated as appropriate: allergies, current medications, past family history, past medical history, past social history, past surgical history and problem list.     Past Medical History:   Diagnosis Date    Anemia     Anorexia     Asthma        Past Surgical History:   Procedure Laterality Date    TONSILLECTOMY         No family history on file.      Medications have been verified.        Objective   /96   Pulse 60   Temp (!) 96.4 °F (35.8 °C)   Resp 16   LMP 01/04/2024 (Approximate)   SpO2 99%        Physical Exam     Physical Exam  Vitals and nursing note reviewed.   Constitutional:       General: She is not in acute distress.     Appearance: Normal appearance. She is not toxic-appearing.   HENT:      Right Ear: Tympanic membrane, ear canal and external ear normal.      Left Ear: Tympanic membrane, ear canal and external ear normal.      Nose: Nose normal.      Mouth/Throat:      Mouth: Mucous membranes are moist.      Dentition: Abnormal dentition. Dental tenderness and dental caries present. No gingival swelling or dental abscesses.   Cardiovascular:      Rate and Rhythm: Normal rate and regular rhythm.   Pulmonary:      Effort: Pulmonary effort is normal.      Breath sounds: Normal breath sounds.   Neurological:      Mental  Status: She is alert.

## 2024-04-05 NOTE — DISCHARGE INSTRUCTIONS
Please follow up PCP and dentist. Recommend tylenol 650 mg every 6 hours or tylenol 1000 mg every 8 hours as needed for pain. Please return for severe chest pain, significant shortness of breath, severely worsening symptoms, or any other concerning signs or symptoms. Please refer to the following documents for additional instructions and return precautions.

## 2024-04-21 ENCOUNTER — HOSPITAL ENCOUNTER (EMERGENCY)
Facility: HOSPITAL | Age: 29
Discharge: HOME | End: 2024-04-21
Attending: EMERGENCY MEDICINE | Admitting: EMERGENCY MEDICINE
Payer: COMMERCIAL

## 2024-04-21 VITALS
TEMPERATURE: 98.4 F | OXYGEN SATURATION: 100 % | HEART RATE: 94 BPM | SYSTOLIC BLOOD PRESSURE: 135 MMHG | DIASTOLIC BLOOD PRESSURE: 77 MMHG | RESPIRATION RATE: 18 BRPM

## 2024-04-21 DIAGNOSIS — O26.892 ABDOMINAL PAIN DURING PREGNANCY IN SECOND TRIMESTER: Primary | ICD-10-CM

## 2024-04-21 DIAGNOSIS — R10.9 ABDOMINAL PAIN DURING PREGNANCY IN SECOND TRIMESTER: Primary | ICD-10-CM

## 2024-04-21 LAB
BILIRUB UR QL STRIP: NEGATIVE
CLARITY UR: CLEAR
COLOR UR: NORMAL
GLUCOSE UR STRIP-MCNC: NEGATIVE MG/DL
HGB UR QL STRIP.AUTO: NEGATIVE
KETONES UR STRIP-MCNC: NEGATIVE MG/DL
LEUKOCYTE ESTERASE UR QL STRIP: NEGATIVE
NITRITE UR QL STRIP: NEGATIVE
PH UR STRIP.AUTO: 6.5 [PH]
PROT UR STRIP-MCNC: NEGATIVE MG/DL
SP GR UR STRIP.AUTO: 1.02 (ref 1–1.03)
UROBILINOGEN UR STRIP-ACNC: <2 MG/DL

## 2024-04-21 PROCEDURE — 99284 EMERGENCY DEPT VISIT MOD MDM: CPT

## 2024-04-21 PROCEDURE — 81003 URINALYSIS AUTO W/O SCOPE: CPT | Performed by: EMERGENCY MEDICINE

## 2024-04-21 PROCEDURE — 99284 EMERGENCY DEPT VISIT MOD MDM: CPT | Performed by: EMERGENCY MEDICINE

## 2024-04-21 PROCEDURE — 87086 URINE CULTURE/COLONY COUNT: CPT | Performed by: EMERGENCY MEDICINE

## 2024-04-21 NOTE — DISCHARGE INSTRUCTIONS
A  personal message from Dr. Kurtis Nicole,  Thank you so much for allowing me to care for you today.    I pride myself in the care and attention I give all my patients.  I hope you were a witness to this tonight.   If for any reason your condition does not improve or worsens, or you have a question that was not answered during your visit you can feel free to text me on my personal phone #  # [phone].   I will answer to your message and continue your care past your emergency room visit.     Please understand that although you are being discharged because your condition has been deemed stable and able to be managed on an outpatient setting. However your condition may worsen as part of the natural progression of the illness/condition, if this occurs please come back to the emergency department for a repeat evaluation.

## 2024-04-22 NOTE — ED PROVIDER NOTES
History  Chief Complaint   Patient presents with    Abdominal Pain Pregnant     Pt was at work when she felt a gush of fluid come out of her. Pt states it was not blood or urine. Pt is now experiencing slight abd cramping. Pt is about 14 weeks pregnant      Kacey Saunders is a 28 y.o.  year old female  Past Medical History:  No date: Anemia  No date: Anorexia  No date: Asthma  Social History    Tobacco Use      Smoking status: Never      Smokeless tobacco: Never    Vaping Use      Vaping status: Never Used    Alcohol use: No    Drug use: No    Patient presents with:  Abdominal Pain Pregnant: Pt was at work when she felt a gush of fluid come out of her. Pt states it was not blood or urine. Pt is now experiencing slight abd cramping. Pt is about 14 weeks pregnant   Patient is  5 para 4.      History obtained directly from the PATIENT              History provided by:  Patient   used: No        Prior to Admission Medications   Prescriptions Last Dose Informant Patient Reported? Taking?   albuterol (PROVENTIL HFA,VENTOLIN HFA) 90 mcg/act inhaler   Yes No   Sig: Inhale 2 puffs every 6 (six) hours as needed   amoxicillin (AMOXIL) 500 mg capsule   Yes No      Facility-Administered Medications: None       Past Medical History:   Diagnosis Date    Anemia     Anorexia     Asthma        Past Surgical History:   Procedure Laterality Date    TONSILLECTOMY         History reviewed. No pertinent family history.  I have reviewed and agree with the history as documented.    E-Cigarette/Vaping    E-Cigarette Use Never User      E-Cigarette/Vaping Substances    Nicotine No     THC No     CBD No     Flavoring No     Other No     Unknown No      Social History     Tobacco Use    Smoking status: Never    Smokeless tobacco: Never   Vaping Use    Vaping status: Never Used   Substance Use Topics    Alcohol use: No    Drug use: No       Review of Systems   Constitutional:  Negative for chills and fever.   HENT:   Negative for ear pain and sore throat.    Eyes:  Negative for pain and visual disturbance.   Respiratory:  Negative for cough and shortness of breath.    Cardiovascular:  Negative for chest pain and palpitations.   Gastrointestinal:  Negative for abdominal pain and vomiting.   Genitourinary:  Negative for dysuria, hematuria, vaginal bleeding, vaginal discharge and vaginal pain.   Musculoskeletal:  Negative for arthralgias and back pain.   Skin:  Negative for color change and rash.   Neurological:  Negative for seizures and syncope.   All other systems reviewed and are negative.      Physical Exam  Physical Exam  Vitals reviewed.   Constitutional:       Appearance: Normal appearance.   HENT:      Head: Normocephalic.      Right Ear: External ear normal.      Left Ear: External ear normal.      Mouth/Throat:      Mouth: Mucous membranes are moist.   Eyes:      Pupils: Pupils are equal, round, and reactive to light.   Cardiovascular:      Rate and Rhythm: Normal rate.   Pulmonary:      Effort: Pulmonary effort is normal.   Skin:     Capillary Refill: Capillary refill takes less than 2 seconds.   Neurological:      General: No focal deficit present.      Mental Status: She is alert and oriented to person, place, and time.   Psychiatric:         Mood and Affect: Mood normal.         Thought Content: Thought content normal.         Vital Signs  ED Triage Vitals [04/21/24 1551]   Temperature Pulse Respirations Blood Pressure SpO2   98.4 °F (36.9 °C) 94 18 135/77 100 %      Temp Source Heart Rate Source Patient Position - Orthostatic VS BP Location FiO2 (%)   Temporal Monitor Sitting Left arm --      Pain Score       --           Vitals:    04/21/24 1551   BP: 135/77   Pulse: 94   Patient Position - Orthostatic VS: Sitting         Visual Acuity      ED Medications  Medications - No data to display    Diagnostic Studies  Results Reviewed       Procedure Component Value Units Date/Time    UA w Reflex to Microscopic w Reflex  to Culture [[id number]] Collected: 04/21/24 1651    Lab Status: Final result Specimen: Urine, Clean Catch Updated: 04/21/24 1712     Color, UA Light Yellow     Clarity, UA Clear     Specific Gravity, UA 1.025     pH, UA 6.5     Leukocytes, UA Negative     Nitrite, UA Negative     Protein, UA Negative mg/dl      Glucose, UA Negative mg/dl      Ketones, UA Negative mg/dl      Urobilinogen, UA <2.0 mg/dl      Bilirubin, UA Negative     Occult Blood, UA Negative     URINE COMMENT --    Urine culture [[id number]] Collected: 04/21/24 1651    Lab Status: In process Specimen: Urine, Clean Catch Updated: 04/21/24 1712                   No orders to display              Procedures  Procedures         ED Course  ED Course as of 04/21/24 2148   Sun Apr 21, 2024 2148 Leukocytes, UA: Negative   2148 Nitrite, UA: Negative                               SBIRT 20yo+      Flowsheet Row Most Recent Value   Initial Alcohol Screen: US AUDIT-C     1. How often do you have a drink containing alcohol? 0 Filed at: 04/21/2024 1553   2. How many drinks containing alcohol do you have on a typical day you are drinking?  0 Filed at: 04/21/2024 1553   3b. FEMALE Any Age, or MALE 65+: How often do you have 4 or more drinks on one occassion? 0 Filed at: 04/21/2024 1553   Audit-C Score 0 Filed at: 04/21/2024 1553   TUYET: How many times in the past year have you...    Used an illegal drug or used a prescription medication for non-medical reasons? Never Filed at: 04/21/2024 1553                      Medical Decision Making  Bedside transabdominal ultrasound was done by me.  It shows strong fetal movements and fetal heart rate.    I consulted with the OB on-call and they suggested to follow-up as an outpatient at this point.    Amount and/or Complexity of Data Reviewed  Labs: ordered.             Disposition  Final diagnoses:   Abdominal pain during pregnancy in second trimester     Time reflects when diagnosis was documented in both MDM as applicable  and the Disposition within this note       Time User Action Codes Description Comment    4/21/2024  4:28 PM Kurtis Nicole Add [O26.892,  R10.9] Abdominal pain during pregnancy in second trimester           ED Disposition       ED Disposition   Discharge    Condition   Stable    Date/Time   Sun Apr 21, 2024 0293    Comment   Kacey Saunders discharge to home/self care.                   Follow-up Information       Follow up With Specialties Details Why Contact Info        call your OB for follow up later this week            Discharge Medication List as of 4/21/2024  4:28 PM        CONTINUE these medications which have NOT CHANGED    Details   albuterol (PROVENTIL HFA,VENTOLIN HFA) 90 mcg/act inhaler Inhale 2 puffs every 6 (six) hours as needed, Starting Sat 5/20/2023, Historical Med      amoxicillin (AMOXIL) 500 mg capsule Historical Med             No discharge procedures on file.    PDMP Review       None            ED Provider  Electronically Signed by             Kurtis Nicole MD  04/21/24 0878

## 2024-04-23 LAB — BACTERIA UR CULT: NORMAL

## 2024-05-01 ENCOUNTER — HOSPITAL ENCOUNTER (EMERGENCY)
Facility: HOSPITAL | Age: 29
Discharge: HOME | End: 2024-05-01
Payer: COMMERCIAL

## 2024-05-01 VITALS
RESPIRATION RATE: 20 BRPM | TEMPERATURE: 97.8 F | OXYGEN SATURATION: 100 % | SYSTOLIC BLOOD PRESSURE: 135 MMHG | DIASTOLIC BLOOD PRESSURE: 81 MMHG | HEART RATE: 77 BPM

## 2024-05-01 DIAGNOSIS — O46.90 VAGINAL BLEEDING IN PREGNANCY: Primary | ICD-10-CM

## 2024-05-01 LAB
ABO GROUP BLD: NORMAL
B-HCG SERPL-ACNC: ABNORMAL MIU/ML (ref 0–5)
BASOPHILS # BLD AUTO: 0.02 THOUSANDS/ÂΜL (ref 0–0.1)
BASOPHILS NFR BLD AUTO: 0 % (ref 0–1)
BILIRUB UR QL STRIP: NEGATIVE
CLARITY UR: CLEAR
COLOR UR: YELLOW
EOSINOPHIL # BLD AUTO: 0.02 THOUSAND/ÂΜL (ref 0–0.61)
EOSINOPHIL NFR BLD AUTO: 0 % (ref 0–6)
ERYTHROCYTE [DISTWIDTH] IN BLOOD BY AUTOMATED COUNT: 13 % (ref 11.6–15.1)
GLUCOSE UR STRIP-MCNC: NEGATIVE MG/DL
HCT VFR BLD AUTO: 34.4 % (ref 34.8–46.1)
HGB BLD-MCNC: 11 G/DL (ref 11.5–15.4)
HGB UR QL STRIP.AUTO: NEGATIVE
IMM GRANULOCYTES # BLD AUTO: 0.01 THOUSAND/UL (ref 0–0.2)
IMM GRANULOCYTES NFR BLD AUTO: 0 % (ref 0–2)
KETONES UR STRIP-MCNC: NEGATIVE MG/DL
LEUKOCYTE ESTERASE UR QL STRIP: NEGATIVE
LYMPHOCYTES # BLD AUTO: 1.31 THOUSANDS/ÂΜL (ref 0.6–4.47)
LYMPHOCYTES NFR BLD AUTO: 21 % (ref 14–44)
MCH RBC QN AUTO: 31.8 PG (ref 26.8–34.3)
MCHC RBC AUTO-ENTMCNC: 32 G/DL (ref 31.4–37.4)
MCV RBC AUTO: 99 FL (ref 82–98)
MONOCYTES # BLD AUTO: 0.5 THOUSAND/ÂΜL (ref 0.17–1.22)
MONOCYTES NFR BLD AUTO: 8 % (ref 4–12)
NEUTROPHILS # BLD AUTO: 4.52 THOUSANDS/ÂΜL (ref 1.85–7.62)
NEUTS SEG NFR BLD AUTO: 71 % (ref 43–75)
NITRITE UR QL STRIP: NEGATIVE
NRBC BLD AUTO-RTO: 0 /100 WBCS
PH UR STRIP.AUTO: 6.5 [PH]
PLATELET # BLD AUTO: 189 THOUSANDS/UL (ref 149–390)
PMV BLD AUTO: 9.3 FL (ref 8.9–12.7)
PROT UR STRIP-MCNC: NEGATIVE MG/DL
RBC # BLD AUTO: 3.46 MILLION/UL (ref 3.81–5.12)
RH BLD: POSITIVE
SP GR UR STRIP.AUTO: 1.02 (ref 1–1.03)
UROBILINOGEN UR STRIP-ACNC: 4 MG/DL
WBC # BLD AUTO: 6.38 THOUSAND/UL (ref 4.31–10.16)

## 2024-05-01 PROCEDURE — 86901 BLOOD TYPING SEROLOGIC RH(D): CPT

## 2024-05-01 PROCEDURE — 80053 COMPREHEN METABOLIC PANEL: CPT

## 2024-05-01 PROCEDURE — 87086 URINE CULTURE/COLONY COUNT: CPT

## 2024-05-01 PROCEDURE — 84702 CHORIONIC GONADOTROPIN TEST: CPT

## 2024-05-01 PROCEDURE — 99285 EMERGENCY DEPT VISIT HI MDM: CPT

## 2024-05-01 PROCEDURE — 81003 URINALYSIS AUTO W/O SCOPE: CPT

## 2024-05-01 PROCEDURE — 36415 COLL VENOUS BLD VENIPUNCTURE: CPT

## 2024-05-01 PROCEDURE — 99284 EMERGENCY DEPT VISIT MOD MDM: CPT

## 2024-05-01 PROCEDURE — 86900 BLOOD TYPING SEROLOGIC ABO: CPT

## 2024-05-01 PROCEDURE — 85025 COMPLETE CBC W/AUTO DIFF WBC: CPT

## 2024-05-01 NOTE — Clinical Note
Kacey Saunders was seen and treated in our emergency department on 5/1/2024.                Diagnosis:     Kacey  may return to work on return date.    She may return on this date: 05/03/2024         If you have any questions or concerns, please don't hesitate to call.      RAKAN Gibbs    ______________________________           _______________          _______________  Hospital Representative                              Date                                Time

## 2024-05-02 NOTE — ED PROVIDER NOTES
History  Chief Complaint   Patient presents with    Vaginal Bleeding     Pt is currently 15 weeks pregnant and had dark brown vaginal discharge. Pt is also having cramping and nausea.      27 y/o female patient presents to the ER for evaluation vaginal bleeding. Patient stated that she was at work today when she started to have brown-color discharge on her underwear and on the toilet paper. Patient stated that she has been having intermittent abdominal cramping. She stated that she is approximately 15 weeks pregnant. LMP: 24. . She did call her OBGYN in switch she was told to come to the ER for further evaluation. She did have a documented IUP with her OBGYN. She denies hx STD, dysuria, vaginal itching. No noted fever, chills, nausea, or vomiting.       History provided by:  Patient      Prior to Admission Medications   Prescriptions Last Dose Informant Patient Reported? Taking?   albuterol (PROVENTIL HFA,VENTOLIN HFA) 90 mcg/act inhaler   Yes No   Sig: Inhale 2 puffs every 6 (six) hours as needed   amoxicillin (AMOXIL) 500 mg capsule   Yes No      Facility-Administered Medications: None       Past Medical History:   Diagnosis Date    Anemia     Anorexia     Asthma        Past Surgical History:   Procedure Laterality Date    TONSILLECTOMY         History reviewed. No pertinent family history.  I have reviewed and agree with the history as documented.    E-Cigarette/Vaping    E-Cigarette Use Never User      E-Cigarette/Vaping Substances    Nicotine No     THC No     CBD No     Flavoring No     Other No     Unknown No      Social History     Tobacco Use    Smoking status: Never    Smokeless tobacco: Never   Vaping Use    Vaping status: Never Used   Substance Use Topics    Alcohol use: No    Drug use: No       Review of Systems   Constitutional:  Negative for chills and fever.   HENT:  Negative for ear pain and sore throat.    Eyes:  Negative for pain and visual disturbance.   Respiratory:  Negative for cough  and shortness of breath.    Cardiovascular:  Negative for chest pain and palpitations.   Gastrointestinal:  Negative for abdominal pain and vomiting.   Genitourinary:  Positive for vaginal bleeding. Negative for dysuria and hematuria.   Musculoskeletal:  Negative for arthralgias and back pain.   Skin:  Negative for color change and rash.   Neurological:  Negative for seizures and syncope.   All other systems reviewed and are negative.      Physical Exam  Physical Exam  Vitals and nursing note reviewed.   Constitutional:       General: She is not in acute distress.     Appearance: She is well-developed.   HENT:      Head: Normocephalic and atraumatic.      Right Ear: External ear normal.      Left Ear: External ear normal.   Eyes:      Conjunctiva/sclera: Conjunctivae normal.   Cardiovascular:      Rate and Rhythm: Normal rate and regular rhythm.      Pulses: Normal pulses.      Heart sounds: Normal heart sounds.   Pulmonary:      Effort: Pulmonary effort is normal. No respiratory distress.      Breath sounds: Normal breath sounds.   Abdominal:      Palpations: Abdomen is soft.      Tenderness: There is no abdominal tenderness.   Musculoskeletal:         General: No swelling.      Cervical back: Neck supple.   Skin:     General: Skin is warm and dry.      Capillary Refill: Capillary refill takes less than 2 seconds.   Neurological:      Mental Status: She is alert and oriented to person, place, and time. Mental status is at baseline.   Psychiatric:         Mood and Affect: Mood normal.         Behavior: Behavior normal.         Vital Signs  ED Triage Vitals   Temperature Pulse Respirations Blood Pressure SpO2   05/01/24 2122 05/01/24 2122 05/01/24 2122 05/01/24 2122 05/01/24 2122   97.8 °F (36.6 °C) 77 20 135/81 100 %      Temp Source Heart Rate Source Patient Position - Orthostatic VS BP Location FiO2 (%)   05/01/24 2122 05/01/24 2122 05/01/24 2122 05/01/24 2122 --   Temporal Monitor Sitting Left arm       Pain  Score       05/01/24 2123       4           Vitals:    05/01/24 2122   BP: 135/81   Pulse: 77   Patient Position - Orthostatic VS: Sitting         Visual Acuity      ED Medications  Medications - No data to display    Diagnostic Studies  Results Reviewed       Procedure Component Value Units Date/Time    hCG, quantitative, pregnancy [[id number]]  (Abnormal) Collected: 05/01/24 2216    Lab Status: Final result Specimen: Blood from Arm, Right Updated: 05/01/24 2318     HCG, Quant 212,791.6 mIU/mL     Narrative:       Expected Ranges:    HCG results between 5.0 and 25.0 mIU/mL may be indicative of early pregnancy but should be interpreted in light of the total clinical presentation.    HCG can rise to detectable levels in owen and post menopausal women (0-11.6 mIU/mL).     Approximate               Approximate HCG  Gestation age          Concentration ( mIU/mL)  _____________          ______________________   Weeks                      HCG values  0.2-1                       5-50  1-2                           2-3                         100-5000  3-4                         500-37199  4-5                         1000-78142  5-6                         19103-012079  6-8                         [id number]  8-12                        00597-087330      Comprehensive metabolic panel [[id number]]  (Abnormal) Collected: 05/01/24 2216    Lab Status: Final result Specimen: Blood from Arm, Right Updated: 05/01/24 2249     Sodium 134 mmol/L      Potassium 3.6 mmol/L      Chloride 102 mmol/L      CO2 23 mmol/L      ANION GAP 9 mmol/L      BUN 11 mg/dL      Creatinine 0.50 mg/dL      Glucose 71 mg/dL      Calcium 8.9 mg/dL      AST 16 U/L      ALT 11 U/L      Alkaline Phosphatase 38 U/L      Total Protein 6.5 g/dL      Albumin 4.0 g/dL      Total Bilirubin 0.44 mg/dL      eGFR 133 ml/min/1.73sq m     Narrative:      National Kid[employer] guidelines for Chronic Kidney Disease (CKD):     Stage 1 with normal or  high GFR (GFR > 90 mL/min/1.73 square meters)    Stage 2 Mild CKD (GFR = 60-89 mL/min/1.73 square meters)    Stage 3A Moderate CKD (GFR = 45-59 mL/min/1.73 square meters)    Stage 3B Moderate CKD (GFR = 30-44 mL/min/1.73 square meters)    Stage 4 Severe CKD (GFR = 15-29 mL/min/1.73 square meters)    Stage 5 End Stage CKD (GFR <15 mL/min/1.73 square meters)  Note: GFR calculation is accurate only with a steady state creatinine    UA w Reflex to Microscopic w Reflex to Culture [[id number]]  (Abnormal) Collected: 05/01/24 2216    Lab Status: Final result Specimen: Urine, Clean Catch Updated: 05/01/24 2233     Color, UA Yellow     Clarity, UA Clear     Specific Gravity, UA 1.022     pH, UA 6.5     Leukocytes, UA Negative     Nitrite, UA Negative     Protein, UA Negative mg/dl      Glucose, UA Negative mg/dl      Ketones, UA Negative mg/dl      Urobilinogen, UA 4.0 mg/dl      Bilirubin, UA Negative     Occult Blood, UA Negative     URINE COMMENT --    Urine culture [[id number]] Collected: 05/01/24 2216    Lab Status: In process Specimen: Urine, Clean Catch Updated: 05/01/24 2233    CBC and differential [[id number]]  (Abnormal) Collected: 05/01/24 2216    Lab Status: Final result Specimen: Blood from Arm, Right Updated: 05/01/24 2223     WBC 6.38 Thousand/uL      RBC 3.46 Million/uL      Hemoglobin 11.0 g/dL      Hematocrit 34.4 %      MCV 99 fL      MCH 31.8 pg      MCHC 32.0 g/dL      RDW 13.0 %      MPV 9.3 fL      Platelets 189 Thousands/uL      nRBC 0 /100 WBCs      Segmented % 71 %      Immature Grans % 0 %      Lymphocytes % 21 %      Monocytes % 8 %      Eosinophils Relative 0 %      Basophils Relative 0 %      Absolute Neutrophils 4.52 Thousands/µL      Absolute Immature Grans 0.01 Thousand/uL      Absolute Lymphocytes 1.31 Thousands/µL      Absolute Monocytes 0.50 Thousand/µL      Eosinophils Absolute 0.02 Thousand/µL      Basophils Absolute 0.02 Thousands/µL                    No orders to display               Procedures  POC Pelvic US    Date/Time: 2024 11:00 PM    Performed by: RAKAN Gibbs  Authorized by: RAKAN Gibbs    Patient location:  Bedside  Procedure details:     Exam Type:  Therapeutic    Indications: evaluate for IUP      Assessment for: confirm intrauterine pregnancy      Technique:  Transabdominal obstetric (HCG+) exam    Image quality: limited diagnostic      Image availability:  Not saved  Uterine findings:     Single gestation: identified      Fetal heart rate: identified      Fetal heart rate (bpm):  152           ED Course  ED Course as of 24 0155   Wed May 01, 2024   2246 Bedside Ultrasound                                              Medical Decision Making  This pregnant patient presents with vaginal bleeding in the second trimester.  Differentials include ectopic, IUP, threatened/inevitable , along with completed .  Patient without history of coagulopathy or infectious symptoms.  Doubt alternate acute emergent pathology.  Patient is Rh+ so RhoGAM not indicated.  Patient had a bedside ultrasound which showed IUP.  Advised to follow-up with primary OB/GYN.  Return to the ER symptoms worsens or questions or concerns arise at home.    Amount and/or Complexity of Data Reviewed  Labs: ordered.             Disposition  Final diagnoses:   Vaginal bleeding in pregnancy     Time reflects when diagnosis was documented in both MDM as applicable and the Disposition within this note       Time User Action Codes Description Comment    2024 11:21 PM Myrna Granados Add [O46.90] Vaginal bleeding in pregnancy           ED Disposition       ED Disposition   Discharge    Condition   Stable    Date/Time   Wed May 1, 2024 11:21 PM    Comment   Kacey Saunders discharge to home/self care.                   Follow-up Information       Follow up With Specialties Details Why Contact Info Additional Information    Daniele Magana,  Family Medicine   [address]  [place] [address]  [phone]       [facility]   [address] [address]  [phone] [facility], [address], [place], [address]     [phone]            Discharge Medication List as of 5/1/2024 11:25 PM        CONTINUE these medications which have NOT CHANGED    Details   albuterol (PROVENTIL HFA,VENTOLIN HFA) 90 mcg/act inhaler Inhale 2 puffs every 6 (six) hours as needed, Starting Sat 5/20/2023, Historical Med      amoxicillin (AMOXIL) 500 mg capsule Historical Med             No discharge procedures on file.    PDMP Review       None            ED Provider  Electronically Signed by             RAKAN Gibbs  05/02/24 0155

## 2024-05-03 LAB — BACTERIA UR CULT: NORMAL

## 2024-05-08 LAB
ALBUMIN SERPL BCP-MCNC: 4 G/DL (ref 3.5–5)
ALP SERPL-CCNC: 38 U/L (ref 34–104)
ALT SERPL W P-5'-P-CCNC: 11 U/L (ref 7–52)
ANION GAP SERPL CALCULATED.3IONS-SCNC: 9 MMOL/L (ref 4–13)
AST SERPL W P-5'-P-CCNC: 16 U/L (ref 13–39)
BILIRUB SERPL-MCNC: 0.44 MG/DL (ref 0.2–1)
BUN SERPL-MCNC: 11 MG/DL (ref 5–25)
CALCIUM SERPL-MCNC: 8.9 MG/DL (ref 8.4–10.2)
CHLORIDE SERPL-SCNC: 102 MMOL/L (ref 96–108)
CO2 SERPL-SCNC: 23 MMOL/L (ref 21–32)
CREAT SERPL-MCNC: 0.5 MG/DL (ref 0.6–1.3)
GFR SERPL CREATININE-BSD FRML MDRD: 133 ML/MIN/1.73SQ M
GLUCOSE SERPL-MCNC: 71 MG/DL (ref 65–140)
POTASSIUM SERPL-SCNC: 3.6 MMOL/L (ref 3.5–5.3)
PROT SERPL-MCNC: 6.5 G/DL (ref 6.4–8.4)
SODIUM SERPL-SCNC: 134 MMOL/L (ref 135–147)

## 2024-05-10 ENCOUNTER — HOSPITAL ENCOUNTER (EMERGENCY)
Facility: HOSPITAL | Age: 29
Discharge: HOME | End: 2024-05-10
Attending: EMERGENCY MEDICINE
Payer: COMMERCIAL

## 2024-05-10 VITALS
RESPIRATION RATE: 16 BRPM | HEART RATE: 75 BPM | TEMPERATURE: 98.2 F | OXYGEN SATURATION: 100 % | DIASTOLIC BLOOD PRESSURE: 67 MMHG | SYSTOLIC BLOOD PRESSURE: 120 MMHG

## 2024-05-10 DIAGNOSIS — R10.9 NONSPECIFIC ABDOMINAL PAIN: Primary | ICD-10-CM

## 2024-05-10 LAB
ALBUMIN SERPL BCP-MCNC: 3.9 G/DL (ref 3.5–5)
ALP SERPL-CCNC: 34 U/L (ref 34–104)
ALT SERPL W P-5'-P-CCNC: 10 U/L (ref 7–52)
ANION GAP SERPL CALCULATED.3IONS-SCNC: 6 MMOL/L (ref 4–13)
AST SERPL W P-5'-P-CCNC: 14 U/L (ref 13–39)
BASOPHILS # BLD AUTO: 0.02 THOUSANDS/ÂΜL (ref 0–0.1)
BASOPHILS NFR BLD AUTO: 0 % (ref 0–1)
BILIRUB SERPL-MCNC: 0.37 MG/DL (ref 0.2–1)
BILIRUB UR QL STRIP: NEGATIVE
BUN SERPL-MCNC: 12 MG/DL (ref 5–25)
CALCIUM SERPL-MCNC: 9.3 MG/DL (ref 8.4–10.2)
CHLORIDE SERPL-SCNC: 103 MMOL/L (ref 96–108)
CLARITY UR: CLEAR
CO2 SERPL-SCNC: 27 MMOL/L (ref 21–32)
COLOR UR: COLORLESS
CREAT SERPL-MCNC: 0.44 MG/DL (ref 0.6–1.3)
EOSINOPHIL # BLD AUTO: 0.05 THOUSAND/ÂΜL (ref 0–0.61)
EOSINOPHIL NFR BLD AUTO: 1 % (ref 0–6)
ERYTHROCYTE [DISTWIDTH] IN BLOOD BY AUTOMATED COUNT: 13.2 % (ref 11.6–15.1)
EXT PREGNANCY TEST URINE: POSITIVE
EXT. CONTROL: ABNORMAL
GFR SERPL CREATININE-BSD FRML MDRD: 138 ML/MIN/1.73SQ M
GLUCOSE SERPL-MCNC: 77 MG/DL (ref 65–140)
GLUCOSE UR STRIP-MCNC: NEGATIVE MG/DL
HCT VFR BLD AUTO: 34.3 % (ref 34.8–46.1)
HGB BLD-MCNC: 11.3 G/DL (ref 11.5–15.4)
HGB UR QL STRIP.AUTO: NEGATIVE
IMM GRANULOCYTES # BLD AUTO: 0.01 THOUSAND/UL (ref 0–0.2)
IMM GRANULOCYTES NFR BLD AUTO: 0 % (ref 0–2)
KETONES UR STRIP-MCNC: NEGATIVE MG/DL
LEUKOCYTE ESTERASE UR QL STRIP: NEGATIVE
LIPASE SERPL-CCNC: 45 U/L (ref 11–82)
LYMPHOCYTES # BLD AUTO: 1.2 THOUSANDS/ÂΜL (ref 0.6–4.47)
LYMPHOCYTES NFR BLD AUTO: 17 % (ref 14–44)
MCH RBC QN AUTO: 32.8 PG (ref 26.8–34.3)
MCHC RBC AUTO-ENTMCNC: 32.9 G/DL (ref 31.4–37.4)
MCV RBC AUTO: 100 FL (ref 82–98)
MONOCYTES # BLD AUTO: 0.44 THOUSAND/ÂΜL (ref 0.17–1.22)
MONOCYTES NFR BLD AUTO: 6 % (ref 4–12)
NEUTROPHILS # BLD AUTO: 5.24 THOUSANDS/ÂΜL (ref 1.85–7.62)
NEUTS SEG NFR BLD AUTO: 76 % (ref 43–75)
NITRITE UR QL STRIP: NEGATIVE
NRBC BLD AUTO-RTO: 0 /100 WBCS
PH UR STRIP.AUTO: 7 [PH]
PLATELET # BLD AUTO: 158 THOUSANDS/UL (ref 149–390)
PMV BLD AUTO: 9.6 FL (ref 8.9–12.7)
POTASSIUM SERPL-SCNC: 4.2 MMOL/L (ref 3.5–5.3)
PROT SERPL-MCNC: 6.3 G/DL (ref 6.4–8.4)
PROT UR STRIP-MCNC: NEGATIVE MG/DL
RBC # BLD AUTO: 3.44 MILLION/UL (ref 3.81–5.12)
SODIUM SERPL-SCNC: 136 MMOL/L (ref 135–147)
SP GR UR STRIP.AUTO: 1 (ref 1–1.03)
UROBILINOGEN UR STRIP-ACNC: <2 MG/DL
WBC # BLD AUTO: 6.96 THOUSAND/UL (ref 4.31–10.16)

## 2024-05-10 PROCEDURE — 83690 ASSAY OF LIPASE: CPT | Performed by: EMERGENCY MEDICINE

## 2024-05-10 PROCEDURE — 80053 COMPREHEN METABOLIC PANEL: CPT | Performed by: EMERGENCY MEDICINE

## 2024-05-10 PROCEDURE — 85025 COMPLETE CBC W/AUTO DIFF WBC: CPT | Performed by: EMERGENCY MEDICINE

## 2024-05-10 PROCEDURE — 99284 EMERGENCY DEPT VISIT MOD MDM: CPT | Performed by: PHYSICIAN ASSISTANT

## 2024-05-10 PROCEDURE — 36415 COLL VENOUS BLD VENIPUNCTURE: CPT

## 2024-05-10 PROCEDURE — 81025 URINE PREGNANCY TEST: CPT | Performed by: PHYSICIAN ASSISTANT

## 2024-05-10 PROCEDURE — 99284 EMERGENCY DEPT VISIT MOD MDM: CPT

## 2024-05-10 PROCEDURE — 96374 THER/PROPH/DIAG INJ IV PUSH: CPT

## 2024-05-10 PROCEDURE — 81003 URINALYSIS AUTO W/O SCOPE: CPT | Performed by: EMERGENCY MEDICINE

## 2024-05-10 PROCEDURE — 96361 HYDRATE IV INFUSION ADD-ON: CPT

## 2024-05-10 RX ORDER — ONDANSETRON 2 MG/ML
4 INJECTION INTRAMUSCULAR; INTRAVENOUS ONCE
Status: COMPLETED | OUTPATIENT
Start: 2024-05-10 | End: 2024-05-10

## 2024-05-10 RX ADMIN — ONDANSETRON 4 MG: 2 INJECTION INTRAMUSCULAR; INTRAVENOUS at 18:30

## 2024-05-10 RX ADMIN — SODIUM CHLORIDE 1000 ML: 0.9 INJECTION, SOLUTION INTRAVENOUS at 18:28

## 2024-05-10 NOTE — Clinical Note
Kcaey Saunders was seen and treated in our emergency department on 5/10/2024.                Diagnosis:     Kacey  .    She may return on this date: 05/11/2024         If you have any questions or concerns, please don't hesitate to call.      Kaia Jean Baptiste RN    ______________________________           _______________          _______________  Hospital Representative                              Date                                Time

## 2024-05-11 NOTE — ED PROVIDER NOTES
History  Chief Complaint   Patient presents with    Abdominal Pain     Pt arrived ambulatory c/o abdominal pain x 5 days, constant pain last 3 days. Pt reports 16.5 weeks pregnant. LBM 2 days ago +nauseous -fever 6/10 pain     29 yo female 16 weeks pregnant here for abdominal pain. Acute on chronic. States she has had this pain for a few years (even prior to pregnancy). She states it begins when she feels like she gets constipated, then gets worse after a BM before shortly afterwards improving. Denies fever, chills, n/v. Normal urination. Normal bowel movements. No chest pain or sob. No vaginal bleeding or spotting.       History provided by:  Patient   used: No    Abdominal Pain  Associated symptoms: no chest pain, no chills, no cough, no dysuria, no fever, no hematuria, no shortness of breath, no sore throat and no vomiting        Prior to Admission Medications   Prescriptions Last Dose Informant Patient Reported? Taking?   albuterol (PROVENTIL HFA,VENTOLIN HFA) 90 mcg/act inhaler   Yes No   Sig: Inhale 2 puffs every 6 (six) hours as needed   amoxicillin (AMOXIL) 500 mg capsule   Yes No      Facility-Administered Medications: None       Past Medical History:   Diagnosis Date    Anemia     Anorexia     Asthma        Past Surgical History:   Procedure Laterality Date    TONSILLECTOMY         History reviewed. No pertinent family history.  I have reviewed and agree with the history as documented.    E-Cigarette/Vaping    E-Cigarette Use Never User      E-Cigarette/Vaping Substances    Nicotine No     THC No     CBD No     Flavoring No     Other No     Unknown No      Social History     Tobacco Use    Smoking status: Never    Smokeless tobacco: Never   Vaping Use    Vaping status: Never Used   Substance Use Topics    Alcohol use: No    Drug use: No       Review of Systems   Constitutional:  Negative for chills and fever.   HENT:  Negative for ear pain and sore throat.    Eyes:  Negative for pain  and visual disturbance.   Respiratory:  Negative for cough and shortness of breath.    Cardiovascular:  Negative for chest pain and palpitations.   Gastrointestinal:  Positive for abdominal pain. Negative for vomiting.   Genitourinary:  Negative for dysuria and hematuria.   Musculoskeletal:  Negative for arthralgias and back pain.   Skin:  Negative for color change and rash.   Neurological:  Negative for seizures and syncope.   All other systems reviewed and are negative.      Physical Exam  Physical Exam  Vitals and nursing note reviewed.   Constitutional:       General: She is not in acute distress.     Appearance: She is well-developed.   HENT:      Head: Normocephalic and atraumatic.   Eyes:      Conjunctiva/sclera: Conjunctivae normal.   Cardiovascular:      Rate and Rhythm: Normal rate and regular rhythm.      Heart sounds: No murmur heard.  Pulmonary:      Effort: Pulmonary effort is normal. No respiratory distress.      Breath sounds: Normal breath sounds.   Abdominal:      Palpations: Abdomen is soft.      Tenderness: There is no abdominal tenderness.      Comments: Bedside fetal US: (+) fetal movement. .    Musculoskeletal:         General: No swelling.      Cervical back: Neck supple.   Skin:     General: Skin is warm and dry.      Capillary Refill: Capillary refill takes less than 2 seconds.   Neurological:      Mental Status: She is alert.      Comments: GCS 15. AAOx3. Ambulating in department without difficulty. CN II-XII grossly intact. No focal neuro deficits.     Psychiatric:         Mood and Affect: Mood normal.         Vital Signs  ED Triage Vitals [05/10/24 1742]   Temperature Pulse Respirations Blood Pressure SpO2   98.2 °F (36.8 °C) 80 14 161/96 100 %      Temp Source Heart Rate Source Patient Position - Orthostatic VS BP Location FiO2 (%)   Temporal Monitor Sitting Left arm --      Pain Score       --           Vitals:    05/10/24 1742 05/10/24 1915   BP: 161/96 120/67   Pulse: 80 75    Patient Position - Orthostatic VS: Sitting          Visual Acuity      ED Medications  Medications   ondansetron (ZOFRAN) injection 4 mg (4 mg Intravenous Given 5/10/24 1830)   sodium chloride 0.9 % bolus 1,000 mL (0 mL Intravenous Stopped 5/10/24 1926)       Diagnostic Studies  Results Reviewed       Procedure Component Value Units Date/Time    Comprehensive metabolic panel [[id number]]  (Abnormal) Collected: 05/10/24 1831    Lab Status: Final result Specimen: Blood from Arm, Right Updated: 05/10/24 1858     Sodium 136 mmol/L      Potassium 4.2 mmol/L      Chloride 103 mmol/L      CO2 27 mmol/L      ANION GAP 6 mmol/L      BUN 12 mg/dL      Creatinine 0.44 mg/dL      Glucose 77 mg/dL      Calcium 9.3 mg/dL      AST 14 U/L      ALT 10 U/L      Alkaline Phosphatase 34 U/L      Total Protein 6.3 g/dL      Albumin 3.9 g/dL      Total Bilirubin 0.37 mg/dL      eGFR 138 ml/min/1.73sq m     Narrative:      National Kidney Disease Foundation guidelines for Chronic Kidney Disease (CKD):     Stage 1 with normal or high GFR (GFR > 90 mL/min/1.73 square meters)    Stage 2 Mild CKD (GFR = 60-89 mL/min/1.73 square meters)    Stage 3A Moderate CKD (GFR = 45-59 mL/min/1.73 square meters)    Stage 3B Moderate CKD (GFR = 30-44 mL/min/1.73 square meters)    Stage 4 Severe CKD (GFR = 15-29 mL/min/1.73 square meters)    Stage 5 End Stage CKD (GFR <15 mL/min/1.73 square meters)  Note: GFR calculation is accurate only with a steady state creatinine    Lipase [[id number]]  (Normal) Collected: 05/10/24 1831    Lab Status: Final result Specimen: Blood from Arm, Right Updated: 05/10/24 1858     Lipase 45 u/L     UA (URINE) with reflex to Scope [[id number]] Collected: 05/10/24 1831    Lab Status: Final result Specimen: Urine, Clean Catch Updated: 05/10/24 1845     Color, UA Colorless     Clarity, UA Clear     Specific Gravity, UA 1.003     pH, UA 7.0     Leukocytes, UA Negative     Nitrite, UA Negative     Protein, UA Negative mg/dl       Glucose, UA Negative mg/dl      Ketones, UA Negative mg/dl      Urobilinogen, UA <2.0 mg/dl      Bilirubin, UA Negative     Occult Blood, UA Negative    CBC and differential [[id number]]  (Abnormal) Collected: 05/10/24 1831    Lab Status: Final result Specimen: Blood from Arm, Right Updated: 05/10/24 1838     WBC 6.96 Thousand/uL      RBC 3.44 Million/uL      Hemoglobin 11.3 g/dL      Hematocrit 34.3 %       fL      MCH 32.8 pg      MCHC 32.9 g/dL      RDW 13.2 %      MPV 9.6 fL      Platelets 158 Thousands/uL      nRBC 0 /100 WBCs      Segmented % 76 %      Immature Grans % 0 %      Lymphocytes % 17 %      Monocytes % 6 %      Eosinophils Relative 1 %      Basophils Relative 0 %      Absolute Neutrophils 5.24 Thousands/µL      Absolute Immature Grans 0.01 Thousand/uL      Absolute Lymphocytes 1.20 Thousands/µL      Absolute Monocytes 0.44 Thousand/µL      Eosinophils Absolute 0.05 Thousand/µL      Basophils Absolute 0.02 Thousands/µL     POCT pregnancy, urine [[id number]]  (Abnormal) Resulted: 05/10/24 1835    Lab Status: Final result Updated: 05/10/24 1836     EXT Preg Test, Ur Positive     Control Valid                   No orders to display              Procedures  Procedures         ED Course                               SBIRT 22yo+      Flowsheet Row Most Recent Value   Initial Alcohol Screen: US AUDIT-C     1. How often do you have a drink containing alcohol? 0 Filed at: 05/10/2024 1835   2. How many drinks containing alcohol do you have on a typical day you are drinking?  0 Filed at: 05/10/2024 1835   3b. FEMALE Any Age, or MALE 65+: How often do you have 4 or more drinks on one occassion? 0 Filed at: 05/10/2024 1835   Audit-C Score 0 Filed at: 05/10/2024 1835   TUYET: How many times in the past year have you...    Used an illegal drug or used a prescription medication for non-medical reasons? Never Filed at: 05/10/2024 1835                      Medical Decision Making  Ddx includes but is not limited  to IBS, IBD, constipation, doubt acute surgical process. Plan: labs.     MDM: 27 yo with abd pain. Acute on chronic. Labs unremarkable. UA negative. Suspect GI process such as IBD, IBS. Doubt acute surgical process. Defer imaging at this time. Will ultimately need to see GI. Tylenol for pain. Return parameters provided. Pt understands and agrees with plan.      Amount and/or Complexity of Data Reviewed  Labs: ordered.    Risk  Prescription drug management.             Disposition  Final diagnoses:   Nonspecific abdominal pain     Time reflects when diagnosis was documented in both MDM as applicable and the Disposition within this note       Time User Action Codes Description Comment    5/10/2024  7:19 PM Johnny Burnette Add [R10.9] Nonspecific abdominal pain           ED Disposition       ED Disposition   Discharge    Condition   Stable    Date/Time   Fri May 10, 2024 1919    Comment   Kacey Saunders discharge to home/self care.                   Follow-up Information       Follow up With Specialties Details Why Contact Info Additional Information    Daniele Magana, DO Family Medicine   [address]  [place] [id number] [phone]       [facility]   [address] [address]  [phone] [facility] [place], [address], [place], [address]  [phone]             Discharge Medication List as of 5/10/2024  7:19 PM        CONTINUE these medications which have NOT CHANGED    Details   albuterol (PROVENTIL HFA,VENTOLIN HFA) 90 mcg/act inhaler Inhale 2 puffs every 6 (six) hours as needed, Starting Sat 5/20/2023, Historical Med      amoxicillin (AMOXIL) 500 mg capsule Historical Med                 PDMP Review       None            ED Provider  Electronically Signed by             Johnny Burnette PA-C  05/10/24 8237

## 2024-05-16 ENCOUNTER — OFFICE VISIT (OUTPATIENT)
Age: 29
End: 2024-05-16
Payer: COMMERCIAL

## 2024-05-16 VITALS
OXYGEN SATURATION: 100 % | BODY MASS INDEX: 21 KG/M2 | DIASTOLIC BLOOD PRESSURE: 76 MMHG | HEIGHT: 69 IN | SYSTOLIC BLOOD PRESSURE: 110 MMHG | WEIGHT: 141.8 LBS | HEART RATE: 80 BPM

## 2024-05-16 DIAGNOSIS — R68.81 EARLY SATIETY: ICD-10-CM

## 2024-05-16 DIAGNOSIS — R10.9 NONSPECIFIC ABDOMINAL PAIN: ICD-10-CM

## 2024-05-16 DIAGNOSIS — R11.0 DAILY NAUSEA: ICD-10-CM

## 2024-05-16 DIAGNOSIS — K59.04 CHRONIC IDIOPATHIC CONSTIPATION: Primary | ICD-10-CM

## 2024-05-16 DIAGNOSIS — E46 PROTEIN-CALORIE MALNUTRITION, UNSPECIFIED SEVERITY (HCC): ICD-10-CM

## 2024-05-16 PROCEDURE — 99204 OFFICE O/P NEW MOD 45 MIN: CPT | Performed by: INTERNAL MEDICINE

## 2024-05-16 RX ORDER — PYRIDOXINE HCL (VITAMIN B6) 50 MG
50 TABLET ORAL DAILY
Qty: 90 TABLET | Refills: 2 | Status: SHIPPED | OUTPATIENT
Start: 2024-05-16

## 2024-05-16 NOTE — PROGRESS NOTES
[facility] - Outpatient Note  Kacey Saunders 28 y.o. female MRN: [id number]  Encounter: [id number]      ASSESSMENT AND PLAN:    Kacey Saunders is a 28 y.o. old pleasant female who is currently 16 weeks pregnant with history of anorexia, severe constipation, chronic abdominal pain, chronic nausea who presents for consultation for abdominal pain and constipation    Severe constipation-this is likely cause of most of her symptoms.  She has a bowel movement every 3 to 4 weeks with lower abdominal cramping, bloating.  Unfortunately given her pregnancy her options are limited as most of the prescription strength medications are category C and have not been adequately studied.  I recommend she take a full MiraLAX bowel prep and then start MiraLAX 3 caps a day with Dulcolax  Increase fluid, fiber  Use stepping stool  Over-the-counter psyllium fiber      Abdominal pain, nausea, early satiety, difficulty gaining weight-I suspect that much of her symptoms are related to severe constipation however cannot rule out gastroparesis, SMA syndrome, gastritis, H. pylori infection, IBS, SIBO among other causes.  She did have MR enterography in the past suggesting SMA syndrome at [facility] in 2017.  She was previously scheduled for EGD however had to cancel due to her previous pregnancy.  She will call back directly to schedule both EGD and colonoscopy after pregnancy is complete and she is cleared by gynecology.  She can take pyridoxine and/or Unisom for her nausea  Unfortunately our options are limited given her pregnancy  She can use H2 blockers per gynecology recommendations to see if this helps with her bloating but I suspect her bloating is largely from her constipation  Will eventually need gastric emptying study and HIDA scan if workup is unremarkable  Consider CTA in the future to reevaluate for SMA syndrome          1. Nonspecific abdominal pain    - Ambulatory Referral to  Gastroenterology    ______________________________________________________________________    SUBJECTIVE: Patient reports history of severe constipation with 1 bowel every 3 to 4 weeks.  She has severe lower abdominal cramping that is even worse when she has a bowel movement after she does not go for 4 weeks.  She has early satiety bloating and feels her abdomen is very distended at times.  She reports a previous colonoscopy several years ago with unclear results.  No previous EGD.  She had MR enterography in the past suggestive of SMA syndrome and was scheduled for EGD however had to cancel due to pregnancy.    Since 16 years old, once 3-4 BM, pain with the BM, senna, colace, miralax, dulcolax, suppositories,MoM, magnesium citrate    Early sateity, bloating    Last colon 2017    CT scna +/- SMA syndrome    Nausea      Hx of anroexia    Denies NSAIDs, marijuana use alcohol use or tobacco use.    I reviewed prior external notes    I reviewed previous lab results and images      REVIEW OF SYSTEMS:     REVIEW OF ALL OTHER SYSTEMS IS OTHERWISE NEGATIVE.      Historical Information   Past Medical History:   Diagnosis Date    Anemia     Anorexia     Asthma      Past Surgical History:   Procedure Laterality Date    TONSILLECTOMY       Social History   Social History     Substance and Sexual Activity   Alcohol Use No     Social History     Substance and Sexual Activity   Drug Use No     Social History     Tobacco Use   Smoking Status Never   Smokeless Tobacco Never     Family History   Problem Relation Age of Onset    Liver disease Father     Breast cancer Maternal Grandmother     Breast cancer Maternal Aunt        Meds/Allergies       Current Outpatient Medications:     albuterol (PROVENTIL HFA,VENTOLIN HFA) 90 mcg/act inhaler    doxylamine (UNISOM) 25 MG tablet    Pyridoxine HCl (vitamin B-6) 50 MG TABS    amoxicillin (AMOXIL) 500 mg capsule    No Known Allergies        Objective     Blood pressure 110/76, pulse 80,  "height 5' 9\" (1.753 m), weight 64.3 kg (141 lb 12.8 oz), last menstrual period 01/04/2024, SpO2 100%. Body mass index is 20.94 kg/m².      PHYSICAL EXAM:      General Appearance:   Alert, cooperative, no distress   HEENT:   Normocephalic, atraumatic, anicteric.     Neck:  Supple, symmetrical, trachea midline   Lungs:   Clear to auscultation bilaterally; no rales, rhonchi or wheezing; respirations unlabored    Heart::   Regular rate and rhythm; no murmur, rub, or gallop.   Abdomen:   Soft, non-tender, non-distended; normal bowel sounds; no masses, no organomegaly    Genitalia:   Deferred    Rectal:   Deferred    Extremities:  No cyanosis, clubbing or edema    Pulses:  2+ and symmetric    Skin:  No jaundice, rashes, or lesions    Lymph nodes:  No palpable cervical lymphadenopathy        Lab Results:   No visits with results within 1 Day(s) from this visit.   Latest known visit with results is:   Admission on 05/10/2024, Discharged on 05/10/2024   Component Date Value    WBC 05/10/2024 6.96     RBC 05/10/2024 3.44 (L)     Hemoglobin 05/10/2024 11.3 (L)     Hematocrit 05/10/2024 34.3 (L)     MCV 05/10/2024 100 (H)     MCH 05/10/2024 32.8     MCHC 05/10/2024 32.9     RDW 05/10/2024 13.2     MPV 05/10/2024 9.6     Platelets 05/10/2024 158     nRBC 05/10/2024 0     Segmented % 05/10/2024 76 (H)     Immature Grans % 05/10/2024 0     Lymphocytes % 05/10/2024 17     Monocytes % 05/10/2024 6     Eosinophils Relative 05/10/2024 1     Basophils Relative 05/10/2024 0     Absolute Neutrophils 05/10/2024 5.24     Absolute Immature Grans 05/10/2024 0.01     Absolute Lymphocytes 05/10/2024 1.20     Absolute Monocytes 05/10/2024 0.44     Eosinophils Absolute 05/10/2024 0.05     Basophils Absolute 05/10/2024 0.02     Sodium 05/10/2024 136     Potassium 05/10/2024 4.2     Chloride 05/10/2024 103     CO2 05/10/2024 27     ANION GAP 05/10/2024 6     BUN 05/10/2024 12     Creatinine 05/10/2024 0.44 (L)     Glucose 05/10/2024 77     Calcium " 05/10/2024 9.3     AST 05/10/2024 14     ALT 05/10/2024 10     Alkaline Phosphatase 05/10/2024 34     Total Protein 05/10/2024 6.3 (L)     Albumin 05/10/2024 3.9     Total Bilirubin 05/10/2024 0.37     eGFR 05/10/2024 138     Lipase 05/10/2024 45     Color, UA 05/10/2024 Colorless     Clarity, UA 05/10/2024 Clear     Specific Gravity, UA 05/10/2024 1.003     pH, UA 05/10/2024 7.0     Leukocytes, UA 05/10/2024 Negative     Nitrite, UA 05/10/2024 Negative     Protein, UA 05/10/2024 Negative     Glucose, UA 05/10/2024 Negative     Ketones, UA 05/10/2024 Negative     Urobilinogen, UA 05/10/2024 <2.0     Bilirubin, UA 05/10/2024 Negative     Occult Blood, UA 05/10/2024 Negative     EXT Preg Test, Ur 05/10/2024 Positive (A)     Control 05/10/2024 Valid          Radiology Results:   US PREG LIMITED 1 OR MORE FETUSES    Result Date: 5/9/2024  Narrative: EXAM: US PREG LIMITED 1 OR MORE FETUSES - 5/9/2024 HISTORY: Cramping pregnancy, Fu viability TECHNIQUE: Grayscale images of the pelvis with transabdominal technique COMPARISON: 05/02/2024 FINDINGS: Live Rogers intrauterine pregnancy in transverse position with head to the maternal right.  Heart rate 154 beats per minute.  Placenta anterior, without previa.  Amount of amniotic fluid subjectively normal.  Maternal ovaries not seen. .    Impression: IMPRESSION: Live single pregnancy, heart rate 154 beats per minute.  No acute findings.    US PREG LIMITED 1 OR MORE FETUSES    Result Date: 5/2/2024  Narrative: EXAM US PREG LIMITED 1 OR MORE FETUSES-5/2/2024 3:15 pm HISTORY VB in pregnancy COMPARISON Ultrasound dated 04/21/2024 TECHNIQUE Limited fetal ultrasound FINDINGS The patient's LENO is 10/27/2024.  Per LENO, the gestational age is 14 weeks 4 days. The fetal heart rate measures 152 beats per minute.  The fetal presentation is breech.  There is an anterior placenta. BPD measures 2.73 cm corresponding to 14 weeks 6 days. HC measures 10.5 cm corresponding to 15 weeks. AC  measures 9 cm corresponding to 15 weeks 1 day. FL measures 1.75 cm corresponding to 15 weeks 1 day. AUA: 15 weeks 1 day The patient's ovaries were not seen on this exam.    Impression: IMPRESSION Single live intrauterine pregnancy with gestational age of 15 weeks 1 day.    AMB US OB < 14 weeks single or first gestation level 1    Result Date: 4/21/2024  Narrative: EXAM US PREG SINGLE/1ST GEST, LESS THAN 14 WKS - 4/21/2024 7:48 pm HISTORY cramping and gush of fluid COMPARISON Pelvic ultrasound 04/18/2024. TECHNIQUE Real time transabdominal sonographic imaging of the pelvis was performed. FINDINGS CROWN RUMP LENGTH: 74.4 mm corresponding to an estimated gestational 13w 4d. LENO (CRL): 10/23/2024 HEART RATE:  165 bpm PLACENTA: Anterior without evidence of previa. MYOMETRIUM: No fibroids identified.  Cervix appears long and closed. RIGHT OVARY: 2.4 cm x 0.9 cm x 2.6 cm, 2.9 ml.  No solid or complex right ovarian lesion is identified. LEFT OVARY: 1.9 cm x 0.7 cm x 1.8 cm, 1.3 ml.  No solid or complex left ovarian lesion is identified. MISCELLANEOUS: No significant free fluid.    Impression: IMPRESSION Single live intrauterine gestation with estimated gestational age of 13 weeks 4 days based on crown-rump length.    AMB US OB < 14 weeks single or first gestation level 1    Result Date: 4/19/2024  Narrative: EXAM US PREG SINGLE/1ST GEST, LESS THAN 14 WKS - 4/18/2024 1:18 pm HISTORY f/u endometrial polyp vs artifact from 01/2024 COMPARISON 03/11/2024 TECHNIQUE Real time sonographic imaging of the pelvis was performed. FINDINGS CURRENT GESTATIONAL AGE: The initial study of 03/11/2024 estimated the date of delivery of 10/27/2024.  Utilizing that date, current gestational age is 12 weeks 4 days. CROWN RUMP LENGTH: 62.4 mm equivalent to 12w 5d. LENO (CRL): 10/26/2024 HEART RATE:  180 bpm YOLK SAC: MYOMETRIUM:  Unremarkable RIGHT OVARY: 2.9 cm x 1.6 cm x 2.2 cm, 5.4 ml.  Unremarkable LEFT OVARY: Not seen MISCELLANEOUS:  No  significant free fluid.    Impression: IMPRESSION Single live intrauterine gestation with normal fetal heart rate and appropriate growth.    Answers submitted by the patient for this visit:  Abdominal Pain Questionnaire (Submitted on 5/16/2024)  Chief Complaint: Abdominal pain  Chronicity: chronic  Onset: more than 1 year ago  Onset quality: sudden  Frequency: constantly  Episode duration: 2 Days  Progression since onset: gradually worsening  Pain location: generalized abdominal region  Pain - numeric: 7/10  Pain quality: burning, a sensation of fullness, sharp, tearing  Radiates to: RUQ, periumbilical region, back, right flank  anorexia: Yes  arthralgias: No  belching: No  constipation: Yes  diarrhea: No  dysuria: No  fever: No  flatus: Yes  frequency: No  headaches: Yes  hematochezia: Yes  hematuria: No  melena: No  myalgias: Yes  nausea: Yes  weight loss: Yes  vomiting: No  Aggravated by: bowel movement, eating  Relieved by: nothing, passing flatus

## 2024-05-21 ENCOUNTER — HOSPITAL ENCOUNTER (EMERGENCY)
Facility: HOSPITAL | Age: 29
Discharge: HOME | End: 2024-05-21
Attending: EMERGENCY MEDICINE | Admitting: EMERGENCY MEDICINE
Payer: COMMERCIAL

## 2024-05-21 VITALS
HEART RATE: 75 BPM | OXYGEN SATURATION: 100 % | TEMPERATURE: 98 F | DIASTOLIC BLOOD PRESSURE: 72 MMHG | RESPIRATION RATE: 18 BRPM | SYSTOLIC BLOOD PRESSURE: 126 MMHG

## 2024-05-21 DIAGNOSIS — O99.019 ANEMIA IN PREGNANCY: ICD-10-CM

## 2024-05-21 DIAGNOSIS — K62.5 RECTAL BLEEDING: ICD-10-CM

## 2024-05-21 LAB
ALBUMIN SERPL BCP-MCNC: 3.5 G/DL (ref 3.5–5)
ALP SERPL-CCNC: 32 U/L (ref 34–104)
ALT SERPL W P-5'-P-CCNC: 11 U/L (ref 7–52)
ANION GAP SERPL CALCULATED.3IONS-SCNC: 9 MMOL/L (ref 4–13)
APTT PPP: 27 SECONDS (ref 23–37)
AST SERPL W P-5'-P-CCNC: 15 U/L (ref 13–39)
BASOPHILS # BLD AUTO: 0.03 THOUSANDS/ÂΜL (ref 0–0.1)
BASOPHILS NFR BLD AUTO: 1 % (ref 0–1)
BILIRUB SERPL-MCNC: 0.27 MG/DL (ref 0.2–1)
BUN SERPL-MCNC: 9 MG/DL (ref 5–25)
CALCIUM SERPL-MCNC: 8.1 MG/DL (ref 8.4–10.2)
CHLORIDE SERPL-SCNC: 109 MMOL/L (ref 96–108)
CO2 SERPL-SCNC: 20 MMOL/L (ref 21–32)
CREAT SERPL-MCNC: 0.49 MG/DL (ref 0.6–1.3)
EOSINOPHIL # BLD AUTO: 0.03 THOUSAND/ÂΜL (ref 0–0.61)
EOSINOPHIL NFR BLD AUTO: 1 % (ref 0–6)
ERYTHROCYTE [DISTWIDTH] IN BLOOD BY AUTOMATED COUNT: 14.3 % (ref 11.6–15.1)
GFR SERPL CREATININE-BSD FRML MDRD: 133 ML/MIN/1.73SQ M
GLUCOSE SERPL-MCNC: 68 MG/DL (ref 65–140)
HCT VFR BLD AUTO: 29.1 % (ref 34.8–46.1)
HGB BLD-MCNC: 9.7 G/DL (ref 11.5–15.4)
IMM GRANULOCYTES # BLD AUTO: 0.01 THOUSAND/UL (ref 0–0.2)
IMM GRANULOCYTES NFR BLD AUTO: 0 % (ref 0–2)
INR PPP: 1.02 (ref 0.84–1.19)
LIPASE SERPL-CCNC: 27 U/L (ref 11–82)
LYMPHOCYTES # BLD AUTO: 0.92 THOUSANDS/ÂΜL (ref 0.6–4.47)
LYMPHOCYTES NFR BLD AUTO: 18 % (ref 14–44)
MCH RBC QN AUTO: 33.1 PG (ref 26.8–34.3)
MCHC RBC AUTO-ENTMCNC: 33.3 G/DL (ref 31.4–37.4)
MCV RBC AUTO: 99 FL (ref 82–98)
MONOCYTES # BLD AUTO: 0.36 THOUSAND/ÂΜL (ref 0.17–1.22)
MONOCYTES NFR BLD AUTO: 7 % (ref 4–12)
NEUTROPHILS # BLD AUTO: 3.66 THOUSANDS/ÂΜL (ref 1.85–7.62)
NEUTS SEG NFR BLD AUTO: 73 % (ref 43–75)
NRBC BLD AUTO-RTO: 0 /100 WBCS
PLATELET # BLD AUTO: 118 THOUSANDS/UL (ref 149–390)
PMV BLD AUTO: 9.6 FL (ref 8.9–12.7)
POTASSIUM SERPL-SCNC: 3.7 MMOL/L (ref 3.5–5.3)
PROT SERPL-MCNC: 5.9 G/DL (ref 6.4–8.4)
PROTHROMBIN TIME: 14.1 SECONDS (ref 11.6–14.5)
RBC # BLD AUTO: 2.93 MILLION/UL (ref 3.81–5.12)
SODIUM SERPL-SCNC: 138 MMOL/L (ref 135–147)
WBC # BLD AUTO: 5.01 THOUSAND/UL (ref 4.31–10.16)

## 2024-05-21 PROCEDURE — 85610 PROTHROMBIN TIME: CPT | Performed by: EMERGENCY MEDICINE

## 2024-05-21 PROCEDURE — 83690 ASSAY OF LIPASE: CPT | Performed by: EMERGENCY MEDICINE

## 2024-05-21 PROCEDURE — 36415 COLL VENOUS BLD VENIPUNCTURE: CPT | Performed by: EMERGENCY MEDICINE

## 2024-05-21 PROCEDURE — 99284 EMERGENCY DEPT VISIT MOD MDM: CPT

## 2024-05-21 PROCEDURE — 85025 COMPLETE CBC W/AUTO DIFF WBC: CPT | Performed by: EMERGENCY MEDICINE

## 2024-05-21 PROCEDURE — 85730 THROMBOPLASTIN TIME PARTIAL: CPT | Performed by: EMERGENCY MEDICINE

## 2024-05-21 PROCEDURE — 96360 HYDRATION IV INFUSION INIT: CPT

## 2024-05-21 PROCEDURE — 80053 COMPREHEN METABOLIC PANEL: CPT | Performed by: EMERGENCY MEDICINE

## 2024-05-21 PROCEDURE — 99284 EMERGENCY DEPT VISIT MOD MDM: CPT | Performed by: EMERGENCY MEDICINE

## 2024-05-21 RX ADMIN — SODIUM CHLORIDE 1000 ML: 0.9 INJECTION, SOLUTION INTRAVENOUS at 11:49

## 2024-05-21 NOTE — ED PROVIDER NOTES
"Pt Name: Kacey Saunders  MRN: [id number]  Birthdate 1995  Age/Sex: 28 y.o. female  Date of evaluation: 2024  PCP: Daniele Magana DO    CHIEF COMPLAINT    Chief Complaint   Patient presents with    Rectal Bleeding     Pt. Reports multiple bright red \"clots\" during bowel movements x2 days.  Lightheadedness improved. Pt is currently 18 weeks gestation.          HPI and MDM    28 y.o. female presenting with rectal bleeding.  Patient is 18 weeks pregnant, follows with [facility].  States she has been dealing with constipation on and off for a long time, follows with GI.  States for the last 2 days she is having blood in her stool, no movement today.  No blood in urine, no vaginal bleeding.  Also states has having lower abdominal pain.  Has had poor appetite throughout her pregnancy, nausea, no vomiting.  No fevers or chills.      I reviewed GI note from 2024, patient with history of anorexia, severe constipation, chronic abdominal pain, chronic nausea, symptoms thought to be likely due to severe constipation.  Given she is pregnant, workup options are limited, only to be pursued after pregnancy.    Upon bedside ultrasound, positive fetal movement, fetal heart rate 155 bpm.    Blood work reveals hemoglobin of 9.7.  11 days ago was 11.3.  Patient was updated on results.  She states she has to go, she has to  her daughter.  I recommended rectal examination to assess for occult blood/hemorrhoids.  Patient would like to defer at this time.  Uncertain if he is still bleeding, advised that I can discuss with GI, and she may perhaps benefit from hospitalization however she states she does not want to stay and has to leave right now and therefore I advised close GI follow-up, PCP follow-up, she states she does have an appointment with MFM tomorrow.  Return precautions are discussed, she verbalized understanding and is in agreement with plan.          Medications   sodium chloride 0.9 % bolus 1,000 " mL (0 mL Intravenous Stopped 5/21/24 1301)         Past Medical and Surgical History    Past Medical History:   Diagnosis Date    Anemia     Anorexia     Asthma        Past Surgical History:   Procedure Laterality Date    TONSILLECTOMY         Family History   Problem Relation Age of Onset    Liver disease Father     Breast cancer Maternal Grandmother     Breast cancer Maternal Aunt        Social History     Tobacco Use    Smoking status: Never    Smokeless tobacco: Never   Vaping Use    Vaping status: Never Used   Substance Use Topics    Alcohol use: No    Drug use: No           Allergies    No Known Allergies    Home Medications    Prior to Admission medications    Medication Sig Start Date End Date Taking? Authorizing Provider   albuterol (PROVENTIL HFA,VENTOLIN HFA) 90 mcg/act inhaler Inhale 2 puffs every 6 (six) hours as needed 5/20/23   Historical Provider, MD   amoxicillin (AMOXIL) 500 mg capsule  4/5/24   Historical Provider, MD   doxylamine (UNISOM) 25 MG tablet Take 1 tablet (25 mg total) by mouth daily at bedtime as needed for sleep 5/16/24   Mariano Shukla MD   Pyridoxine HCl (vitamin B-6) 50 MG TABS Take 1 tablet (50 mg total) by mouth daily 5/16/24   Mariano Shukla MD           Physical Exam      ED Triage Vitals [05/21/24 1057]   Temperature Pulse Respirations Blood Pressure SpO2   98 °F (36.7 °C) 75 18 126/72 100 %      Temp Source Heart Rate Source Patient Position - Orthostatic VS BP Location FiO2 (%)   Oral Monitor Sitting Left arm --      Pain Score       --               Physical Exam  Constitutional:       General: She is not in acute distress.     Appearance: She is not ill-appearing.   HENT:      Head: Normocephalic and atraumatic.      Nose: Nose normal.      Mouth/Throat:      Mouth: Mucous membranes are moist.   Eyes:      Extraocular Movements: Extraocular movements intact.      Pupils: Pupils are equal, round, and reactive to light.   Cardiovascular:      Rate and Rhythm: Normal rate and  regular rhythm.   Pulmonary:      Effort: No respiratory distress.      Breath sounds: Normal breath sounds. No wheezing.   Abdominal:      General: There is no distension.      Palpations: Abdomen is soft.      Tenderness: There is no abdominal tenderness. There is no guarding or rebound.   Musculoskeletal:         General: No swelling or deformity. Normal range of motion.      Cervical back: Normal range of motion and neck supple.   Skin:     General: Skin is warm.      Findings: No erythema.   Neurological:      Mental Status: She is alert and oriented to person, place, and time. Mental status is at baseline.              Diagnostic Results      Labs:    Results Reviewed       Procedure Component Value Units Date/Time    Protime-INR [[id number]]  (Normal) Collected: 05/21/24 1148    Lab Status: Final result Specimen: Blood from Arm, Left Updated: 05/21/24 1214     Protime 14.1 seconds      INR 1.02    APTT [[id number]]  (Normal) Collected: 05/21/24 1148    Lab Status: Final result Specimen: Blood from Arm, Left Updated: 05/21/24 1214     PTT 27 seconds     Comprehensive metabolic panel [[id number]]  (Abnormal) Collected: 05/21/24 1148    Lab Status: Final result Specimen: Blood from Arm, Left Updated: 05/21/24 1210     Sodium 138 mmol/L      Potassium 3.7 mmol/L      Chloride 109 mmol/L      CO2 20 mmol/L      ANION GAP 9 mmol/L      BUN 9 mg/dL      Creatinine 0.49 mg/dL      Glucose 68 mg/dL      Calcium 8.1 mg/dL      AST 15 U/L      ALT 11 U/L      Alkaline Phosphatase 32 U/L      Total Protein 5.9 g/dL      Albumin 3.5 g/dL      Total Bilirubin 0.27 mg/dL      eGFR 133 ml/min/1.73sq m     Narrative:      National Kid[employer] Foundation guidelines for Chronic Kidney Disease (CKD):     Stage 1 with normal or high GFR (GFR > 90 mL/min/1.73 square meters)    Stage 2 Mild CKD (GFR = 60-89 mL/min/1.73 square meters)    Stage 3A Moderate CKD (GFR = 45-59 mL/min/1.73 square meters)    Stage 3B Moderate CKD (GFR =  30-44 mL/min/1.73 square meters)    Stage 4 Severe CKD (GFR = 15-29 mL/min/1.73 square meters)    Stage 5 End Stage CKD (GFR <15 mL/min/1.73 square meters)  Note: GFR calculation is accurate only with a steady state creatinine    Lipase [[id number]]  (Normal) Collected: 05/21/24 1148    Lab Status: Final result Specimen: Blood from Arm, Left Updated: 05/21/24 1210     Lipase 27 u/L     CBC and differential [[id number]]  (Abnormal) Collected: 05/21/24 1148    Lab Status: Final result Specimen: Blood from Arm, Left Updated: 05/21/24 1156     WBC 5.01 Thousand/uL      RBC 2.93 Million/uL      Hemoglobin 9.7 g/dL      Hematocrit 29.1 %      MCV 99 fL      MCH 33.1 pg      MCHC 33.3 g/dL      RDW 14.3 %      MPV 9.6 fL      Platelets 118 Thousands/uL      nRBC 0 /100 WBCs      Segmented % 73 %      Immature Grans % 0 %      Lymphocytes % 18 %      Monocytes % 7 %      Eosinophils Relative 1 %      Basophils Relative 1 %      Absolute Neutrophils 3.66 Thousands/µL      Absolute Immature Grans 0.01 Thousand/uL      Absolute Lymphocytes 0.92 Thousands/µL      Absolute Monocytes 0.36 Thousand/µL      Eosinophils Absolute 0.03 Thousand/µL      Basophils Absolute 0.03 Thousands/µL             All labs reviewed and utilized in the medical decision making process    Radiology:    No orders to display       All radiology studies independently viewed by me and interpreted by the radiologist.    Procedure    Procedures        FINAL IMPRESSION    Final diagnoses:   Rectal bleeding   Anemia in pregnancy         DISPOSITION    Time reflects when diagnosis was documented in both MDM as applicable and the Disposition within this note       Time User Action Codes Description Comment    5/21/2024 12:44 PM Silver Parra Add [K62.5] Rectal bleeding     5/21/2024 12:44 PM Silver Parra Add [O99.019] Anemia in pregnancy     5/21/2024 12:44 PM Silver Parra Modify [K62.5] Rectal bleeding     5/21/2024 12:44 PM FidelSilver orozco Modify [O99.019] Anemia  in pregnancy           ED Disposition       ED Disposition   Discharge    Condition   Stable    Date/Time   Tue May 21, 2024 12:44 PM    Comment   Kacey Saunders discharge to home/self care.                   Follow-up Information       Follow up With Specialties Details Why Contact Info    Mariano Shukla MD Gastroenterology, Internal Medicine Call in 1 day  [address]  [address]  [facility] [address]  [phone]                PATIENT REFERRED TO:    Mariano Shukla MD  [address]  [address]  [facility] [address]  [phone]    Call in 1 day        DISCHARGE MEDICATIONS:    Discharge Medication List as of 5/21/2024 12:45 PM        CONTINUE these medications which have NOT CHANGED    Details   albuterol (PROVENTIL HFA,VENTOLIN HFA) 90 mcg/act inhaler Inhale 2 puffs every 6 (six) hours as needed, Starting Sat 5/20/2023, Historical Med      amoxicillin (AMOXIL) 500 mg capsule Historical Med      doxylamine (UNISOM) 25 MG tablet Take 1 tablet (25 mg total) by mouth daily at bedtime as needed for sleep, Starting Thu 5/16/2024, Normal      Pyridoxine HCl (vitamin B-6) 50 MG TABS Take 1 tablet (50 mg total) by mouth daily, Starting Thu 5/16/2024, Normal                      Silver Parra DO        This note was partially completed using voice recognition technology, and was scanned for gross errors; however some errors may still exist. Please contact the author with any questions or requests for clarification.      Silver Parra DO  05/21/24 2051

## 2024-05-21 NOTE — ED NOTES
Pt. Had dental work this morning reports slurred speech and facial numbness due to numbing agent.     Porsha Arvizu  05/21/24 7700

## 2024-05-22 ENCOUNTER — NURSE TRIAGE (OUTPATIENT)
Age: 29
End: 2024-05-22

## 2024-05-22 NOTE — TELEPHONE ENCOUNTER
"Pt calling in, reports she was seen in the ED yesterday for bleeding and went to see her high risk ob today. Her ob would like her to see GI again for OV to discuss. Appt scheduled for tomorrow.   Reason for Disposition  • Information only question and nurse able to answer    Answer Assessment - Initial Assessment Questions  1. REASON FOR CALL or QUESTION: \"What is your reason for calling today?\" or \"How can I best help you?\" or \"What question do you have that I can help answer?\"      See notes    Protocols used: Information Only Call - No Triage-ADULT-[place]    "

## 2024-05-29 ENCOUNTER — TELEPHONE (OUTPATIENT)
Dept: GASTROENTEROLOGY | Facility: CLINIC | Age: 29
End: 2024-05-29

## 2024-06-17 ENCOUNTER — APPOINTMENT (OUTPATIENT)
Dept: LAB | Facility: CLINIC | Age: 29
End: 2024-06-17

## 2024-06-17 DIAGNOSIS — Z00.6 ENCOUNTER FOR EXAMINATION FOR NORMAL COMPARISON OR CONTROL IN CLINICAL RESEARCH PROGRAM: ICD-10-CM

## 2024-06-17 PROCEDURE — 36415 COLL VENOUS BLD VENIPUNCTURE: CPT

## 2024-06-22 ENCOUNTER — APPOINTMENT (OUTPATIENT)
Dept: RADIOLOGY | Facility: CLINIC | Age: 29
End: 2024-06-22
Payer: COMMERCIAL

## 2024-06-22 ENCOUNTER — OFFICE VISIT (OUTPATIENT)
Dept: URGENT CARE | Facility: CLINIC | Age: 29
End: 2024-06-22
Payer: COMMERCIAL

## 2024-06-22 VITALS
DIASTOLIC BLOOD PRESSURE: 78 MMHG | TEMPERATURE: 97.4 F | OXYGEN SATURATION: 100 % | SYSTOLIC BLOOD PRESSURE: 120 MMHG | BODY MASS INDEX: 22.3 KG/M2 | RESPIRATION RATE: 17 BRPM | WEIGHT: 151 LBS | HEART RATE: 80 BPM

## 2024-06-22 DIAGNOSIS — R05.1 ACUTE COUGH: ICD-10-CM

## 2024-06-22 DIAGNOSIS — J98.8 RESPIRATORY INFECTION: ICD-10-CM

## 2024-06-22 DIAGNOSIS — R06.00 DYSPNEA, UNSPECIFIED TYPE: ICD-10-CM

## 2024-06-22 DIAGNOSIS — J02.9 SORE THROAT: Primary | ICD-10-CM

## 2024-06-22 LAB — S PYO AG THROAT QL: NEGATIVE

## 2024-06-22 PROCEDURE — 99214 OFFICE O/P EST MOD 30 MIN: CPT | Performed by: PHYSICIAN ASSISTANT

## 2024-06-22 PROCEDURE — S9088 SERVICES PROVIDED IN URGENT: HCPCS | Performed by: PHYSICIAN ASSISTANT

## 2024-06-22 PROCEDURE — 87070 CULTURE OTHR SPECIMN AEROBIC: CPT | Performed by: PHYSICIAN ASSISTANT

## 2024-06-22 PROCEDURE — 87880 STREP A ASSAY W/OPTIC: CPT | Performed by: PHYSICIAN ASSISTANT

## 2024-06-22 PROCEDURE — 71046 X-RAY EXAM CHEST 2 VIEWS: CPT

## 2024-06-22 RX ORDER — AZITHROMYCIN 250 MG/1
TABLET, FILM COATED ORAL
Qty: 6 TABLET | Refills: 0 | Status: SHIPPED | OUTPATIENT
Start: 2024-06-22 | End: 2024-06-26

## 2024-06-22 RX ORDER — AMOXICILLIN 500 MG/1
500 TABLET, FILM COATED ORAL 3 TIMES DAILY
Qty: 15 TABLET | Refills: 0 | Status: SHIPPED | OUTPATIENT
Start: 2024-06-22 | End: 2024-06-27

## 2024-06-22 NOTE — PROGRESS NOTES
[facility]        NAME: Kacey Saunders is a 28 y.o. female  : 1995    MRN: [id number]  DATE: 2024  TIME: 3:13 PM    Assessment and Plan   Respiratory infection [J98.8]  1. Respiratory infection  amoxicillin (AMOXIL) 500 MG tablet    azithromycin (ZITHROMAX) 250 mg tablet      2. Sore throat  POCT rapid strepA      3. Dyspnea, unspecified type  XR chest pa & lateral      4. Acute cough  XR chest pa & lateral          Patient has an acute respiratory tract infection discussed patient this may be a viral infection however given her pregnancy asthma we will treat her with antibiotics.  We did have a discussion regarding her pain with breathing she reports this only started after she began coughing she denies any wheezing or increased use of her albuterol I do not hear any wheezing on exam.  Discussed that her pregnancy does put her at high risk of DVT or PE.  She does not have any pleuritic chest pain at the moment I did mention to her about evaluation in the ED to rule out definitively any PE given her pain with breathing she would prefer not be evaluated the ED at this time.  I did strongly encourage her with any worsening symptoms to proceed to emergency department.  Based on PERC criteria she does have a very low chance less than 2% of any PE.     The patient verbalized understanding of exam findings and treatment plan.   We engaged in the shared decision-making process and treatment options were   discussed at length with the patient.  All questions, concerns and  complaints were answered and addressed to the patient's satisfaction.    Patient Instructions   There are no Patient Instructions on file for this visit.    Follow up with PCP in 3-5 days.  Proceed to  ER if symptoms worsen.    If tests are performed, our office will contact you with results only if   changes need to made to the care plan discussed with you at the visit.   [facility]      Chief Complaint     Chief Complaint   Patient presents with   • Shortness of Breath     Pt c/o difficulty breathing, stating it hurts to take a breath in. Pt also c/o sore throat and loss of voice since yesterday.          History of Present Illness       HPI  Pt  reports difficulty taking deep breath, causing pain with deep breath that started 5-6 days ago. Yesterday had hoarseness, green phlegm, sore throat. No fever or chills. No recent travel or immobilization. She is 23w pregnant. No history of blood clot or clotting disorders. No trauma to her lower extremities.no history of cancer.     Review of Systems   Review of Systems  All other related systems reviewed and are negative except as noted in HPI    Current Medications       Current Outpatient Medications:   •  albuterol (PROVENTIL HFA,VENTOLIN HFA) 90 mcg/act inhaler, Inhale 2 puffs every 6 (six) hours as needed, Disp: , Rfl:   •  amoxicillin (AMOXIL) 500 MG tablet, Take 1 tablet (500 mg total) by mouth 3 (three) times a day for 5 days, Disp: 15 tablet, Rfl: 0  •  ascorbic acid (VITAMIN C) 250 mg tablet, Take 250 mg by mouth daily, Disp: , Rfl:   •  azithromycin (ZITHROMAX) 250 mg tablet, Take 2 tablets today then 1 tablet daily x 4 days, Disp: 6 tablet, Rfl: 0  •  Prenatal Vit-Fe Fumarate-FA (PRENATAL VITAMIN PO), Take by mouth, Disp: , Rfl:   •  Pyridoxine HCl (vitamin B-6) 50 MG TABS, Take 1 tablet (50 mg total) by mouth daily, Disp: 90 tablet, Rfl: 2  •  chlorhexidine (PERIDEX) 0.12 % solution, 15 ML TO THE MOUTH OR THROAT TWICE A DAY FOR 14 DAYS AS DIRECTED BY PHYSICIAN (Patient not taking: Reported on 6/22/2024), Disp: , Rfl:   •  doxylamine (UNISOM) 25 MG tablet, Take 1 tablet (25 mg total) by mouth daily at bedtime as needed for sleep (Patient not taking: Reported on 6/22/2024), Disp: 90 tablet, Rfl: 0  •  ferrous sulfate 325 (65 Fe) mg tablet, Take 325 mg by mouth daily (Patient not taking: Reported on 6/22/2024), Disp: , Rfl:   •  Magnesium  400 MG TABS, Take 1 tablet by mouth daily (Patient not taking: Reported on 6/22/2024), Disp: , Rfl:   •  Riboflavin 400 MG CAPS, Take 400 mg by mouth daily (Patient not taking: Reported on 6/22/2024), Disp: , Rfl:     Current Allergies     Allergies as of 06/22/2024   • (No Known Allergies)            The following portions of the patient's history were reviewed and updated as appropriate: allergies, current medications, past family history, past medical history, past social history, past surgical history and problem list.     Past Medical History:   Diagnosis Date   • Anemia    • Anorexia    • Asthma        Past Surgical History:   Procedure Laterality Date   • TONSILLECTOMY         Family History   Problem Relation Age of Onset   • Liver disease Father    • Breast cancer Maternal Grandmother    • Breast cancer Maternal Aunt          Medications have been verified.        Objective   /78   Pulse 80   Temp (!) 97.4 °F (36.3 °C)   Resp 17   Wt 68.5 kg (151 lb)   LMP 01/04/2024 (Approximate)   SpO2 100%   BMI 22.30 kg/m²   Patient's last menstrual period was 01/04/2024 (approximate).       Physical Exam     Physical Exam  Constitutional:       General: She is not in acute distress.     Appearance: She is well-developed. She is not diaphoretic.   HENT:      Head: Normocephalic and atraumatic.      Mouth/Throat:      Pharynx: Posterior oropharyngeal erythema present.   Eyes:      General: No scleral icterus.     Conjunctiva/sclera: Conjunctivae normal.   Neck:      Trachea: No tracheal deviation.   Cardiovascular:      Rate and Rhythm: Normal rate and regular rhythm.      Heart sounds: Normal heart sounds. No murmur heard.  Pulmonary:      Effort: Pulmonary effort is normal. No respiratory distress.      Breath sounds: No stridor. Examination of the right-lower field reveals rhonchi. Rhonchi present. No wheezing or rales.   Musculoskeletal:      Cervical back: Normal range of motion and neck supple.  "  Lymphadenopathy:      Cervical: No cervical adenopathy.   Skin:     General: Skin is warm and dry.      Findings: No erythema.   Neurological:      Mental Status: She is alert and oriented to person, place, and time.   Psychiatric:         Behavior: Behavior normal.         Ortho Exam    I have personally reviewed pertinent films in PACS and my interpretation is x-ray of the chest was performed today PA and lateral view show no acute cardiopulmonary process no infiltrate or consolidation.    Procedures  No Procedures performed today        Note: Portions of this record may have been created with voice recognition software. Occasional wrong word or \"sound a like\" substitutions may have occurred due to the inherent limitations of voice recognition software. Please read the chart carefully and recognize, using context, where substitutions have occurred.*      "

## 2024-06-23 LAB — BACTERIA THROAT CULT: NORMAL

## 2024-06-24 LAB — BACTERIA THROAT CULT: NORMAL

## 2024-06-27 ENCOUNTER — HOSPITAL ENCOUNTER (EMERGENCY)
Facility: HOSPITAL | Age: 29
End: 2024-06-27
Attending: STUDENT IN AN ORGANIZED HEALTH CARE EDUCATION/TRAINING PROGRAM
Payer: COMMERCIAL

## 2024-06-27 VITALS
TEMPERATURE: 98.6 F | DIASTOLIC BLOOD PRESSURE: 73 MMHG | RESPIRATION RATE: 16 BRPM | OXYGEN SATURATION: 100 % | BODY MASS INDEX: 22.45 KG/M2 | HEART RATE: 73 BPM | SYSTOLIC BLOOD PRESSURE: 118 MMHG | WEIGHT: 152 LBS

## 2024-06-27 DIAGNOSIS — O26.899 ABDOMINAL PAIN IN PREGNANCY: Primary | ICD-10-CM

## 2024-06-27 DIAGNOSIS — R10.9 ABDOMINAL PAIN IN PREGNANCY: Primary | ICD-10-CM

## 2024-06-27 LAB
BILIRUB UR QL STRIP: NEGATIVE
CLARITY UR: CLEAR
COLOR UR: YELLOW
GLUCOSE UR STRIP-MCNC: NEGATIVE MG/DL
HGB UR QL STRIP.AUTO: NEGATIVE
KETONES UR STRIP-MCNC: NEGATIVE MG/DL
LEUKOCYTE ESTERASE UR QL STRIP: NEGATIVE
NITRITE UR QL STRIP: NEGATIVE
PH UR STRIP.AUTO: 6.5 [PH]
PROT UR STRIP-MCNC: NEGATIVE MG/DL
SP GR UR STRIP.AUTO: 1.01 (ref 1–1.03)
UROBILINOGEN UR QL STRIP.AUTO: 0.2 E.U./DL

## 2024-06-27 PROCEDURE — 99284 EMERGENCY DEPT VISIT MOD MDM: CPT

## 2024-06-27 PROCEDURE — 81003 URINALYSIS AUTO W/O SCOPE: CPT

## 2024-06-27 PROCEDURE — 99285 EMERGENCY DEPT VISIT HI MDM: CPT | Performed by: STUDENT IN AN ORGANIZED HEALTH CARE EDUCATION/TRAINING PROGRAM

## 2024-06-27 PROCEDURE — 87086 URINE CULTURE/COLONY COUNT: CPT

## 2024-06-27 RX ORDER — ACETAMINOPHEN 325 MG/1
650 TABLET ORAL ONCE
Status: COMPLETED | OUTPATIENT
Start: 2024-06-27 | End: 2024-06-27

## 2024-06-27 RX ORDER — POLYETHYLENE GLYCOL 3350 17 G/17G
17 POWDER, FOR SOLUTION ORAL ONCE
Status: COMPLETED | OUTPATIENT
Start: 2024-06-27 | End: 2024-06-27

## 2024-06-27 RX ORDER — SODIUM PHOSPHATE,MONO-DIBASIC 19G-7G/118
1 ENEMA (ML) RECTAL ONCE
Status: COMPLETED | OUTPATIENT
Start: 2024-06-27 | End: 2024-06-27

## 2024-06-27 RX ADMIN — POLYETHYLENE GLYCOL 3350 17 G: 17 POWDER, FOR SOLUTION ORAL at 15:41

## 2024-06-27 RX ADMIN — SODIUM PHOSPHATE, DIBASIC AND SODIUM PHOSPHATE, MONOBASIC 1 ENEMA: 7; 19 ENEMA RECTAL at 15:41

## 2024-06-27 RX ADMIN — ACETAMINOPHEN 650 MG: 325 TABLET, FILM COATED ORAL at 15:41

## 2024-06-27 NOTE — DISCHARGE INSTRUCTIONS
[facility] Go to the main lobby as per L&D let them know that you are a transferred patient and are expected.  If you cannot get through the main doors go through the ER.  Let them know that you are transferred patient and expected to labor and delivery triage and nowhere else.

## 2024-06-27 NOTE — ED NOTES
Patient states that she can only transfer to facility with OB if she is able to go private vehicle.  She does not have anyone available to  the two children who are at bedside with her, and she will need to drop them off before transferring.      Mariana Pittman RN  06/27/24 1667

## 2024-06-27 NOTE — ED PROVIDER NOTES
History  Chief Complaint   Patient presents with    Abdominal Pain     G5G3 presents 23 wks gestation with c/o inability to pass gas, and no bowel movement in 2 weeks (which is reportedly WNL), generalized abdominal pain that began this morning.     Abdominal Pain Pregnant     The patient is a 28 y.o. female with a history of anemia, anorexia, asthma who presents to [facility] with a chief complaint of abdominal pain. Symptoms began yesterday and have been worsening since onset. Her pain is currently rated as a 5/10 in severity and described as intermittent without radiation. Associated symptoms include constipation. Symptoms are aggravated with none noted and alleviating factors include none noted. The patient denies fever, chills, night sweats, chest pain, shortness breath, vaginal bleeding, discharge, hematemesis, hemoptysis. No other reported symptoms at this time.  Patient denies allergies to anything  On POCUS of patient baby has good fetal movement and strong         History provided by:  Patient   used: No    Abdominal Pain  Associated symptoms: constipation    Associated symptoms: no chest pain, no chills, no cough, no dysuria, no fever, no hematuria, no shortness of breath, no sore throat and no vomiting        Prior to Admission Medications   Prescriptions Last Dose Informant Patient Reported? Taking?   Magnesium 400 MG TABS   Yes No   Sig: Take 1 tablet by mouth daily   Patient not taking: Reported on 6/22/2024   Prenatal Vit-Fe Fumarate-FA (PRENATAL VITAMIN PO)   Yes No   Sig: Take by mouth   Pyridoxine HCl (vitamin B-6) 50 MG TABS   No No   Sig: Take 1 tablet (50 mg total) by mouth daily   Riboflavin 400 MG CAPS   Yes No   Sig: Take 400 mg by mouth daily   Patient not taking: Reported on 6/22/2024   albuterol (PROVENTIL HFA,VENTOLIN HFA) 90 mcg/act inhaler  Self Yes No   Sig: Inhale 2 puffs every 6 (six) hours as needed   amoxicillin (AMOXIL) 500 MG tablet    No No   Sig: Take 1 tablet (500 mg total) by mouth 3 (three) times a day for 5 days   ascorbic acid (VITAMIN C) 250 mg tablet   Yes No   Sig: Take 250 mg by mouth daily   azithromycin (ZITHROMAX) 250 mg tablet   No No   Sig: Take 2 tablets today then 1 tablet daily x 4 days   chlorhexidine (PERIDEX) 0.12 % solution   Yes No   Sig: 15 ML TO THE MOUTH OR THROAT TWICE A DAY FOR 14 DAYS AS DIRECTED BY PHYSICIAN   Patient not taking: Reported on 6/22/2024   doxylamine (UNISOM) 25 MG tablet   No No   Sig: Take 1 tablet (25 mg total) by mouth daily at bedtime as needed for sleep   Patient not taking: Reported on 6/22/2024   ferrous sulfate 325 (65 Fe) mg tablet   Yes No   Sig: Take 325 mg by mouth daily   Patient not taking: Reported on 6/22/2024      Facility-Administered Medications: None       Past Medical History:   Diagnosis Date    Anemia     Anorexia     Asthma        Past Surgical History:   Procedure Laterality Date    TONSILLECTOMY         Family History   Problem Relation Age of Onset    Liver disease Father     Breast cancer Maternal Grandmother     Breast cancer Maternal Aunt      I have reviewed and agree with the history as documented.    E-Cigarette/Vaping    E-Cigarette Use Never User      E-Cigarette/Vaping Substances    Nicotine No     THC No     CBD No     Flavoring No     Other No     Unknown No      Social History     Tobacco Use    Smoking status: Never    Smokeless tobacco: Never   Vaping Use    Vaping status: Never Used   Substance Use Topics    Alcohol use: No    Drug use: No       Review of Systems   Constitutional:  Negative for chills and fever.   HENT:  Negative for ear pain and sore throat.    Eyes:  Negative for pain and visual disturbance.   Respiratory:  Negative for cough and shortness of breath.    Cardiovascular:  Negative for chest pain and palpitations.   Gastrointestinal:  Positive for abdominal pain and constipation. Negative for vomiting.   Genitourinary:  Negative for dysuria and  hematuria.   Musculoskeletal:  Negative for arthralgias and back pain.   Skin:  Negative for color change and rash.   Neurological:  Negative for seizures and syncope.   All other systems reviewed and are negative.      Physical Exam  Physical Exam  Vitals reviewed.   Constitutional:       General: She is not in acute distress.     Appearance: She is well-developed. She is not ill-appearing.   HENT:      Head: Normocephalic.   Eyes:      Extraocular Movements: Extraocular movements intact.   Cardiovascular:      Rate and Rhythm: Normal rate.   Pulmonary:      Effort: Pulmonary effort is normal. No respiratory distress.      Breath sounds: No stridor. No wheezing or rhonchi.   Abdominal:      General: Abdomen is protuberant.      Palpations: Abdomen is soft.      Tenderness: There is generalized abdominal tenderness. There is right CVA tenderness and left CVA tenderness.   Skin:     General: Skin is warm and dry.      Capillary Refill: Capillary refill takes less than 2 seconds.      Coloration: Skin is not cyanotic or mottled.   Neurological:      General: No focal deficit present.      Mental Status: She is alert and oriented to person, place, and time.         Vital Signs  ED Triage Vitals [06/27/24 1406]   Temperature Pulse Respirations Blood Pressure SpO2   98.9 °F (37.2 °C) 85 18 147/92 100 %      Temp Source Heart Rate Source Patient Position - Orthostatic VS BP Location FiO2 (%)   Oral Monitor Sitting Left arm --      Pain Score       --           Vitals:    06/27/24 1406 06/27/24 1847 06/27/24 1900   BP: 147/92 118/73 118/73   Pulse: 85 73    Patient Position - Orthostatic VS: Sitting Sitting          Visual Acuity      ED Medications  Medications   acetaminophen (TYLENOL) tablet 650 mg (650 mg Oral Given 6/27/24 1541)   sodium phosphate-biphosphate (FLEET) enema 1 enema (1 enema Rectal Given 6/27/24 1541)   polyethylene glycol (MIRALAX) packet 17 g (17 g Oral Given 6/27/24 1541)       Diagnostic  Studies  Results Reviewed       Procedure Component Value Units Date/Time    Urine culture [[id number]] Collected: 06/27/24 1619    Lab Status: Final result Specimen: Urine, Clean Catch Updated: 06/29/24 1547     Urine Culture 60,000-69,000 cfu/ml    UA w Reflex to Microscopic w Reflex to Culture [[id number]] Collected: 06/27/24 1619    Lab Status: Final result Specimen: Urine, Clean Catch Updated: 06/27/24 1646     Color, UA Yellow     Clarity, UA Clear     Specific Gravity, UA 1.015     pH, UA 6.5     Leukocytes, UA Negative     Nitrite, UA Negative     Protein, UA Negative mg/dl      Glucose, UA Negative mg/dl      Ketones, UA Negative mg/dl      Urobilinogen, UA 0.2 E.U./dl      Bilirubin, UA Negative     Occult Blood, UA Negative     URINE COMMENT --                   No orders to display              Procedures  Procedures         ED Course                                             Medical Decision Making  The patient is a 28 y.o. female with a history of anemia, anorexia, asthma who presents to [facility] with a chief complaint of abdominal pain.  Given patient is just about 23 weeks pregnant reach out to her OB and will transfer to [place].  Patient would like to transfer by private vehicle as she has 2 little kids with her that she needs to drop off at home first as they only have 1 vehicle.  UA negative and patient without bowel movement medicine.  On focused exam patient fetus has good movement and heart rate of 178. Discussed directions as to where she is to go and where to go once she arrives at [facility]. Patient understands and agrees with treatment plan.     Problems Addressed:  Abdominal pain in pregnancy: acute illness or injury    Amount and/or Complexity of Data Reviewed  Labs: ordered.    Risk  OTC drugs.             Disposition  Final diagnoses:   Abdominal pain in pregnancy     Time reflects when diagnosis was documented in both MDM as applicable and the Disposition within  this note       Time User Action Codes Description Comment    6/27/2024  6:31 PM Maged Lainez Add [O26.899,  R10.9] Abdominal pain in pregnancy           ED Disposition       ED Disposition   Transfer to Another Facility-In Network    Condition   --    Date/Time   Thu Jun 27, 2024  6:32 PM    Comment   Kacey Saunders should be transferred out to [facility].               MD Documentation      Flowsheet Row Most Recent Value   Patient Condition The patient has been stabilized such that within reasonable medical probability, no material deterioration of the patient condition or the condition of the unborn child(ilir) is likely to result from the transfer   Reason for Transfer Level of Care needed not available at this facility   Benefits of Transfer Specialized equipment and/or services available at the receiving facility (Include comment)________________________   Risks of Transfer Potential for delay in receiving treatment, Potential deterioration of medical condition, Loss of IV, Increased discomfort during transfer, Possible worsening of condition or death during transfer   Accepting Physician Dr. Byrne   Accepting Facility Name, [facility]    (Name & Tel number) Nataliya Batista   Sending MD Dr. Armando   Provider Certification General risk, such as traffic hazards, adverse weather conditions, rough terrain or turbulence, possible failure of equipment (including vehicle or aircraft), or consequences of actions of persons outside the control of the transport personnel, Unanticipated needs of medical equipment and personnel during transport, Risk of worsening condition, The possibility of a transport vehicle being unavailable          RN Documentation      Flowsheet Row Most Recent Value   Accepting Facility Name, [facility]    (Name & Tel number) Nataliya Batista          Follow-up Information    None          Discharge Medication List as of 6/27/2024  6:56 PM        CONTINUE these medications which have NOT CHANGED    Details   albuterol (PROVENTIL HFA,VENTOLIN HFA) 90 mcg/act inhaler Inhale 2 puffs every 6 (six) hours as needed, Starting Sat 5/20/2023, Historical Med      amoxicillin (AMOXIL) 500 MG tablet Take 1 tablet (500 mg total) by mouth 3 (three) times a day for 5 days, Starting Sat 6/22/2024, Until Thu 6/27/2024, Normal      ascorbic acid (VITAMIN C) 250 mg tablet Take 250 mg by mouth daily, Starting Fri 4/12/2024, Historical Med      chlorhexidine (PERIDEX) 0.12 % solution 15 ML TO THE MOUTH OR THROAT TWICE A DAY FOR 14 DAYS AS DIRECTED BY PHYSICIAN, Historical Med      doxylamine (UNISOM) 25 MG tablet Take 1 tablet (25 mg total) by mouth daily at bedtime as needed for sleep, Starting Thu 5/16/2024, Normal      ferrous sulfate 325 (65 Fe) mg tablet Take 325 mg by mouth daily, Starting Fri 4/12/2024, Historical Med      Magnesium 400 MG TABS Take 1 tablet by mouth daily, Starting Mon 5/13/2024, Historical Med      Prenatal Vit-Fe Fumarate-FA (PRENATAL VITAMIN PO) Take by mouth, Historical Med      Pyridoxine HCl (vitamin B-6) 50 MG TABS Take 1 tablet (50 mg total) by mouth daily, Starting Thu 5/16/2024, Normal      Riboflavin 400 MG CAPS Take 400 mg by mouth daily, Starting Mon 5/13/2024, Historical Med           STOP taking these medications       azithromycin (ZITHROMAX) 250 mg tablet Comments:   Reason for Stopping:               No discharge procedures on file.    PDMP Review       None            ED Provider  Electronically Signed by             Maged Lainez PA-C  06/30/24 1968

## 2024-06-27 NOTE — EMTALA/ACUTE CARE TRANSFER
[facility] EMERGENCY DEPARTMENT  [address]  [place] [address]  Dept: [phone]      EMTALA TRANSFER CONSENT    NAME Kacey Saunders                                         1995                              MRN [id number]    I have been informed of my rights regarding examination, treatment, and transfer   by Dr. Olena Armando,     Benefits:      Risks:        Consent for Transfer:  I acknowledge that my medical condition has been evaluated and explained to me by the emergency department physician or other qualified medical person and/or my attending physician, who has recommended that I be transferred to the service of    at  . The above potential benefits of such transfer, the potential risks associated with such transfer, and the probable risks of not being transferred have been explained to me, and I fully understand them.  The doctor has explained that, in my case, the benefits of transfer outweigh the risks.  I agree to be transferred.    I authorize the performance of emergency medical procedures and treatments upon me in both transit and upon arrival at the receiving facility.  Additionally, I authorize the release of any and all medical records to the receiving facility and request they be transported with me, if possible.  I understand that the safest mode of transportation during a medical emergency is an ambulance and that the Hospital advocates the use of this mode of transport. Risks of traveling to the receiving facility by car, including absence of medical control, life sustaining equipment, such as oxygen, and medical personnel has been explained to me and I fully understand them.    (SRINIVASAN CORRECT BOX BELOW)  [  ]  I consent to the stated transfer and to be transported by ambulance/helicopter.  [  ]  I consent to the stated transfer, but refuse transportation by ambulance and accept full responsibility for my transportation by car.  I understand the  risks of non-ambulance transfers and I exonerate the Hospital and its staff from any deterioration in my condition that results from this refusal.    X___________________________________________    DATE  24  TIME________  Signature of patient or legally responsible individual signing on patient behalf           RELATIONSHIP TO PATIENT_________________________          Provider Certification    NAME Kacey Saunders                                         1995                              MRN [id number]    A medical screening exam was performed on the above named patient.  Based on the examination:    Condition Necessitating Transfer The encounter diagnosis was Abdominal pain in pregnancy.    Patient Condition:      Reason for Transfer:      Transfer Requirements: Facility     Space available and qualified personnel available for treatment as acknowledged by    Agreed to accept transfer and to provide appropriate medical treatment as acknowledged by          Appropriate medical records of the examination and treatment of the patient are provided at the time of transfer   STAFF INITIAL WHEN COMPLETED _______  Transfer will be performed by qualified personnel from    and appropriate transfer equipment as required, including the use of necessary and appropriate life support measures.    Provider Certification: I have examined the patient and explained the following risks and benefits of being transferred/refusing transfer to the patient/family:         Based on these reasonable risks and benefits to the patient and/or the unborn child(ilir), and based upon the information available at the time of the patient’s examination, I certify that the medical benefits reasonably to be expected from the provision of appropriate medical treatments at another medical facility outweigh the increasing risks, if any, to the individual’s medical condition, and in the case of labor to the unborn child, from effecting the  transfer.    X____________________________________________ DATE 06/27/24        TIME_______      ORIGINAL - SEND TO MEDICAL RECORDS   COPY - SEND WITH PATIENT DURING TRANSFER

## 2024-06-29 LAB — BACTERIA UR CULT: NORMAL

## 2024-07-09 LAB
APOB+LDLR+PCSK9 GENE MUT ANL BLD/T: NOT DETECTED
BRCA1+BRCA2 DEL+DUP + FULL MUT ANL BLD/T: NOT DETECTED
MLH1+MSH2+MSH6+PMS2 GN DEL+DUP+FUL M: NOT DETECTED

## 2025-01-01 ENCOUNTER — HOSPITAL ENCOUNTER (EMERGENCY)
Facility: HOSPITAL | Age: 30
Discharge: HOME | End: 2025-01-02
Attending: EMERGENCY MEDICINE
Payer: COMMERCIAL

## 2025-01-01 ENCOUNTER — APPOINTMENT (EMERGENCY)
Dept: RADIOLOGY | Facility: HOSPITAL | Age: 30
End: 2025-01-01
Payer: COMMERCIAL

## 2025-01-01 DIAGNOSIS — R07.9 CHEST PAIN: Primary | ICD-10-CM

## 2025-01-01 DIAGNOSIS — R42 LIGHTHEADED: ICD-10-CM

## 2025-01-01 LAB
ALBUMIN SERPL BCG-MCNC: 4.7 G/DL (ref 3.5–5)
ALP SERPL-CCNC: 62 U/L (ref 34–104)
ALT SERPL W P-5'-P-CCNC: 191 U/L (ref 7–52)
ANION GAP SERPL CALCULATED.3IONS-SCNC: 12 MMOL/L (ref 4–13)
AST SERPL W P-5'-P-CCNC: 187 U/L (ref 13–39)
BASOPHILS # BLD AUTO: 0.03 THOUSANDS/ΜL (ref 0–0.1)
BASOPHILS NFR BLD AUTO: 1 % (ref 0–1)
BILIRUB SERPL-MCNC: 0.65 MG/DL (ref 0.2–1)
BUN SERPL-MCNC: 10 MG/DL (ref 5–25)
CALCIUM SERPL-MCNC: 9.7 MG/DL (ref 8.4–10.2)
CARDIAC TROPONIN I PNL SERPL HS: 4 NG/L (ref ?–50)
CHLORIDE SERPL-SCNC: 103 MMOL/L (ref 96–108)
CO2 SERPL-SCNC: 25 MMOL/L (ref 21–32)
CREAT SERPL-MCNC: 0.54 MG/DL (ref 0.6–1.3)
D DIMER PPP FEU-MCNC: 0.32 UG/ML FEU
EOSINOPHIL # BLD AUTO: 0.05 THOUSAND/ΜL (ref 0–0.61)
EOSINOPHIL NFR BLD AUTO: 1 % (ref 0–6)
ERYTHROCYTE [DISTWIDTH] IN BLOOD BY AUTOMATED COUNT: 13.5 % (ref 11.6–15.1)
FLUAV RNA RESP QL NAA+PROBE: NEGATIVE
FLUBV RNA RESP QL NAA+PROBE: NEGATIVE
GFR SERPL CREATININE-BSD FRML MDRD: 128 ML/MIN/1.73SQ M
GLUCOSE SERPL-MCNC: 81 MG/DL (ref 65–140)
HCT VFR BLD AUTO: 39.5 % (ref 34.8–46.1)
HGB BLD-MCNC: 12.6 G/DL (ref 11.5–15.4)
IMM GRANULOCYTES # BLD AUTO: 0.02 THOUSAND/UL (ref 0–0.2)
IMM GRANULOCYTES NFR BLD AUTO: 0 % (ref 0–2)
LYMPHOCYTES # BLD AUTO: 0.73 THOUSANDS/ΜL (ref 0.6–4.47)
LYMPHOCYTES NFR BLD AUTO: 13 % (ref 14–44)
MCH RBC QN AUTO: 31.6 PG (ref 26.8–34.3)
MCHC RBC AUTO-ENTMCNC: 31.9 G/DL (ref 31.4–37.4)
MCV RBC AUTO: 99 FL (ref 82–98)
MONOCYTES # BLD AUTO: 0.5 THOUSAND/ΜL (ref 0.17–1.22)
MONOCYTES NFR BLD AUTO: 9 % (ref 4–12)
NEUTROPHILS # BLD AUTO: 4.37 THOUSANDS/ΜL (ref 1.85–7.62)
NEUTS SEG NFR BLD AUTO: 76 % (ref 43–75)
NRBC BLD AUTO-RTO: 0 /100 WBCS
PLATELET # BLD AUTO: 161 THOUSANDS/UL (ref 149–390)
PMV BLD AUTO: 9.5 FL (ref 8.9–12.7)
POTASSIUM SERPL-SCNC: 4.3 MMOL/L (ref 3.5–5.3)
PROT SERPL-MCNC: 7.2 G/DL (ref 6.4–8.4)
RBC # BLD AUTO: 3.99 MILLION/UL (ref 3.81–5.12)
RSV RNA RESP QL NAA+PROBE: NEGATIVE
SARS-COV-2 RNA RESP QL NAA+PROBE: NEGATIVE
SODIUM SERPL-SCNC: 140 MMOL/L (ref 135–147)
WBC # BLD AUTO: 5.7 THOUSAND/UL (ref 4.31–10.16)

## 2025-01-01 PROCEDURE — 85379 FIBRIN DEGRADATION QUANT: CPT | Performed by: EMERGENCY MEDICINE

## 2025-01-01 PROCEDURE — 71045 X-RAY EXAM CHEST 1 VIEW: CPT

## 2025-01-01 PROCEDURE — 99285 EMERGENCY DEPT VISIT HI MDM: CPT

## 2025-01-01 PROCEDURE — 84484 ASSAY OF TROPONIN QUANT: CPT | Performed by: EMERGENCY MEDICINE

## 2025-01-01 PROCEDURE — 0241U HB NFCT DS VIR RESP RNA 4 TRGT: CPT | Performed by: EMERGENCY MEDICINE

## 2025-01-01 PROCEDURE — 85025 COMPLETE CBC W/AUTO DIFF WBC: CPT | Performed by: EMERGENCY MEDICINE

## 2025-01-01 PROCEDURE — 36415 COLL VENOUS BLD VENIPUNCTURE: CPT | Performed by: EMERGENCY MEDICINE

## 2025-01-01 PROCEDURE — 80053 COMPREHEN METABOLIC PANEL: CPT | Performed by: EMERGENCY MEDICINE

## 2025-01-01 PROCEDURE — 93005 ELECTROCARDIOGRAM TRACING: CPT

## 2025-01-01 RX ORDER — KETOROLAC TROMETHAMINE 30 MG/ML
15 INJECTION, SOLUTION INTRAMUSCULAR; INTRAVENOUS ONCE
Status: COMPLETED | OUTPATIENT
Start: 2025-01-02 | End: 2025-01-02

## 2025-01-02 VITALS
OXYGEN SATURATION: 96 % | TEMPERATURE: 98.1 F | DIASTOLIC BLOOD PRESSURE: 75 MMHG | HEART RATE: 72 BPM | SYSTOLIC BLOOD PRESSURE: 122 MMHG | RESPIRATION RATE: 16 BRPM

## 2025-01-02 LAB
2HR DELTA HS TROPONIN: -1 NG/L
ATRIAL RATE: 113 BPM
CARDIAC TROPONIN I PNL SERPL HS: 3 NG/L (ref ?–50)
P AXIS: 66 DEGREES
PR INTERVAL: 174 MS
QRS AXIS: 43 DEGREES
QRSD INTERVAL: 92 MS
QT INTERVAL: 336 MS
QTC INTERVAL: 460 MS
T WAVE AXIS: 64 DEGREES
VENTRICULAR RATE: 113 BPM

## 2025-01-02 PROCEDURE — 96374 THER/PROPH/DIAG INJ IV PUSH: CPT

## 2025-01-02 PROCEDURE — 36415 COLL VENOUS BLD VENIPUNCTURE: CPT | Performed by: EMERGENCY MEDICINE

## 2025-01-02 PROCEDURE — 84484 ASSAY OF TROPONIN QUANT: CPT | Performed by: EMERGENCY MEDICINE

## 2025-01-02 PROCEDURE — 96361 HYDRATE IV INFUSION ADD-ON: CPT

## 2025-01-02 PROCEDURE — 99284 EMERGENCY DEPT VISIT MOD MDM: CPT | Performed by: EMERGENCY MEDICINE

## 2025-01-02 PROCEDURE — 93010 ELECTROCARDIOGRAM REPORT: CPT | Performed by: STUDENT IN AN ORGANIZED HEALTH CARE EDUCATION/TRAINING PROGRAM

## 2025-01-02 RX ADMIN — SODIUM CHLORIDE 1000 ML: 0.9 INJECTION, SOLUTION INTRAVENOUS at 00:19

## 2025-01-02 RX ADMIN — KETOROLAC TROMETHAMINE 15 MG: 30 INJECTION, SOLUTION INTRAMUSCULAR at 00:19

## 2025-01-07 NOTE — ED PROVIDER NOTES
Time reflects when diagnosis was documented in both MDM as applicable and the Disposition within this note       Time User Action Codes Description Comment    1/2/2025  2:14 AM Cam Sullivan Add [R07.9] Chest pain     1/2/2025  2:15 AM Cam Sullivan Add [R42] Lightheaded           ED Disposition       ED Disposition   Discharge    Condition   Stable    Date/Time   Thu Jan 2, 2025  2:08 AM    Comment   Kacey Saunders discharge to home/self care.                   Assessment & Plan       Medical Decision Making  29-year-old female with chest pain, anxious/tachycardic on initial exam will check labs EKG D-dimer chest x-ray, reassess.    Amount and/or Complexity of Data Reviewed  Labs: ordered.  Radiology: ordered.    Risk  Prescription drug management.             Medications   sodium chloride 0.9 % bolus 1,000 mL (0 mL Intravenous Stopped 1/2/25 0145)   ketorolac (TORADOL) injection 15 mg (15 mg Intravenous Given 1/2/25 0019)       ED Risk Strat Scores                          SBIRT 20yo+      Flowsheet Row Most Recent Value   Initial Alcohol Screen: US AUDIT-C     1. How often do you have a drink containing alcohol? 0 Filed at: 01/01/2025 1953   2. How many drinks containing alcohol do you have on a typical day you are drinking?  0 Filed at: 01/01/2025 1953   3b. FEMALE Any Age, or MALE 65+: How often do you have 4 or more drinks on one occassion? 0 Filed at: 01/01/2025 1953   Audit-C Score 0 Filed at: 01/01/2025 1953   TUYET: How many times in the past year have you...    Used an illegal drug or used a prescription medication for non-medical reasons? Never Filed at: 01/01/2025 1953                            History of Present Illness       Chief Complaint   Patient presents with    Chest Pain     Pt reports mid-central CP x2 days as well as HTN. Pt very anxious in triage.       Past Medical History:   Diagnosis Date    Anemia     Anorexia     Asthma       Past Surgical History:   Procedure Laterality Date     TONSILLECTOMY        Family History   Problem Relation Age of Onset    Liver disease Father     Breast cancer Maternal Grandmother     Breast cancer Maternal Aunt       Social History     Tobacco Use    Smoking status: Never    Smokeless tobacco: Never   Vaping Use    Vaping status: Never Used   Substance Use Topics    Alcohol use: No    Drug use: No      E-Cigarette/Vaping    E-Cigarette Use Never User       E-Cigarette/Vaping Substances    Nicotine No     THC No     CBD No     Flavoring No     Other No     Unknown No       I have reviewed and agree with the history as documented.     21-year-old female presenting to the emergency department for evaluation of chest pain.  Patient has substernal chest pain for 2 days and high blood pressure reading at home.  Not associated with palpitations lightheadedness or shortness of breath no syncope.  No cough fever chills.  Patient is moderately anxious on exam.        Review of Systems   Constitutional:  Negative for appetite change, chills, fatigue and fever.   HENT:  Negative for sneezing and sore throat.    Eyes:  Negative for visual disturbance.   Respiratory:  Negative for cough, choking, chest tightness, shortness of breath and wheezing.    Cardiovascular:  Positive for chest pain. Negative for palpitations.   Gastrointestinal:  Negative for abdominal pain, constipation, diarrhea, nausea and vomiting.   Genitourinary:  Negative for difficulty urinating and dysuria.   Neurological:  Negative for dizziness, weakness, light-headedness, numbness and headaches.   Psychiatric/Behavioral:  The patient is nervous/anxious.    All other systems reviewed and are negative.          Objective       ED Triage Vitals   Temperature Pulse Blood Pressure Respirations SpO2 Patient Position - Orthostatic VS   01/01/25 1952 01/01/25 1952 01/01/25 1952 01/01/25 1952 01/01/25 1952 01/01/25 1952   98.1 °F (36.7 °C) (!) 124 142/88 22 100 % Sitting      Temp Source Heart Rate Source BP  Location FiO2 (%) Pain Score    01/01/25 1952 01/01/25 1952 01/01/25 1952 -- 01/02/25 0019    Oral Monitor Left arm  6      Vitals      Date and Time Temp Pulse SpO2 Resp BP Pain Score FACES Pain Rating User   01/02/25 0122 -- 72 96 % 16 122/75 -- -- SL   01/02/25 0121 -- 76 98 % 20 130/82 -- -- SL   01/02/25 0116 -- 84 97 % 22 125/79 -- -- SL   01/02/25 0115 -- 78 99 % 16 109/81 -- -- SL   01/02/25 0030 -- 76 100 % 20 119/82 -- -- SL   01/02/25 0019 -- -- -- -- -- 6 -- ML   01/01/25 1952 98.1 °F (36.7 °C) 124 100 % 22 142/88 -- -- ML            Physical Exam  Vitals and nursing note reviewed.   Constitutional:       General: She is not in acute distress.     Appearance: She is well-developed. She is not diaphoretic.   HENT:      Head: Normocephalic and atraumatic.   Eyes:      Pupils: Pupils are equal, round, and reactive to light.   Neck:      Vascular: No JVD.      Trachea: No tracheal deviation.   Cardiovascular:      Rate and Rhythm: Normal rate and regular rhythm.      Heart sounds: Normal heart sounds. No murmur heard.     No friction rub. No gallop.   Pulmonary:      Effort: Pulmonary effort is normal. No respiratory distress.      Breath sounds: Normal breath sounds. No wheezing or rales.   Abdominal:      General: Bowel sounds are normal. There is no distension.      Palpations: Abdomen is soft.      Tenderness: There is no abdominal tenderness. There is no guarding or rebound.   Skin:     General: Skin is warm and dry.      Coloration: Skin is not pale.   Neurological:      Mental Status: She is alert and oriented to person, place, and time.      Cranial Nerves: No cranial nerve deficit.      Motor: No abnormal muscle tone.   Psychiatric:         Behavior: Behavior normal.         Results Reviewed       Procedure Component Value Units Date/Time    HS Troponin I 2hr [[id number]]  (Normal) Collected: 01/02/25 2467    Lab Status: Final result Specimen: Blood from Arm, Right Updated: 01/02/25 0222     hs TnI  2hr 3 ng/L      Delta 2hr hsTnI -1 ng/L     FLU/RSV/COVID - if FLU/RSV clinically relevant (2hr TAT) [[id number]]  (Normal) Collected: 01/01/25 2312    Lab Status: Final result Specimen: Nares from Nose Updated: 01/01/25 7387     SARS-CoV-2 Negative     INFLUENZA A PCR Negative     INFLUENZA B PCR Negative     RSV PCR Negative    Narrative:      This test has been performed using the CoV-2/Flu/RSV plus assay on the [employer] platform. This test has been validated by the  and verified by the performing laboratory.     This test is designed to amplify and detect the following: nucleocapsid (N), envelope (E), and RNA-dependent RNA polymerase (RdRP) genes of the SARS-CoV-2 genome; matrix (M), basic polymerase (PB2), and acidic protein (PA) segments of the influenza A genome; matrix (M) and non-structural protein (NS) segments of the influenza B genome, and the nucleocapsid genes of RSV A and RSV B.     Positive results are indicative of the presence of Flu A, Flu B, RSV, and/or SARS-CoV-2 RNA. Positive results for SARS-CoV-2 or suspected novel influenza should be reported to state, local, or federal health departments according to local reporting requirements.      All results should be assessed in conjunction with clinical presentation and other laboratory markers for clinical management.     FOR PEDIATRIC PATIENTS - copy/paste COVID Guidelines URL to browser: [email or web address]       HS Troponin 0hr (reflex protocol) [[id number]]  (Normal) Collected: 01/01/25 2245    Lab Status: Final result Specimen: Blood from Arm, Right Updated: 01/01/25 2313     hs TnI 0hr 4 ng/L     Comprehensive metabolic panel [[id number]]  (Abnormal) Collected: 01/01/25 2245    Lab Status: Final result Specimen: Blood from Arm, Right Updated: 01/01/25 2306     Sodium 140 mmol/L      Potassium 4.3 mmol/L      Chloride 103 mmol/L      CO2 25 mmol/L      ANION GAP 12 mmol/L       BUN 10 mg/dL      Creatinine 0.54 mg/dL      Glucose 81 mg/dL      Calcium 9.7 mg/dL       U/L       U/L      Alkaline Phosphatase 62 U/L      Total Protein 7.2 g/dL      Albumin 4.7 g/dL      Total Bilirubin 0.65 mg/dL      eGFR 128 ml/min/1.73sq m     Narrative:      [employer] guidelines for Chronic Kidney Disease (CKD):     Stage 1 with normal or high GFR (GFR > 90 mL/min/1.73 square meters)    Stage 2 Mild CKD (GFR = 60-89 mL/min/1.73 square meters)    Stage 3A Moderate CKD (GFR = 45-59 mL/min/1.73 square meters)    Stage 3B Moderate CKD (GFR = 30-44 mL/min/1.73 square meters)    Stage 4 Severe CKD (GFR = 15-29 mL/min/1.73 square meters)    Stage 5 End Stage CKD (GFR <15 mL/min/1.73 square meters)  Note: GFR calculation is accurate only with a steady state creatinine    D-dimer, quantitative [[id number]]  (Normal) Collected: 01/01/25 2245    Lab Status: Final result Specimen: Blood from Arm, Right Updated: 01/01/25 2305     D-Dimer, Quant 0.32 ug/ml FEU     CBC and differential [[id number]]  (Abnormal) Collected: 01/01/25 2245    Lab Status: Final result Specimen: Blood from Arm, Right Updated: 01/01/25 2251     WBC 5.70 Thousand/uL      RBC 3.99 Million/uL      Hemoglobin 12.6 g/dL      Hematocrit 39.5 %      MCV 99 fL      MCH 31.6 pg      MCHC 31.9 g/dL      RDW 13.5 %      MPV 9.5 fL      Platelets 161 Thousands/uL      nRBC 0 /100 WBCs      Segmented % 76 %      Immature Grans % 0 %      Lymphocytes % 13 %      Monocytes % 9 %      Eosinophils Relative 1 %      Basophils Relative 1 %      Absolute Neutrophils 4.37 Thousands/µL      Absolute Immature Grans 0.02 Thousand/uL      Absolute Lymphocytes 0.73 Thousands/µL      Absolute Monocytes 0.50 Thousand/µL      Eosinophils Absolute 0.05 Thousand/µL      Basophils Absolute 0.03 Thousands/µL             XR chest 1 view portable   Final Interpretation by Johny Taylor MD (01/02 0049)      No acute cardiopulmonary  disease.            Workstation performed: [id number]             Procedures    ED Medication and Procedure Management   Prior to Admission Medications   Prescriptions Last Dose Informant Patient Reported? Taking?   Magnesium 400 MG TABS   Yes No   Sig: Take 1 tablet by mouth daily   Patient not taking: Reported on 6/22/2024   Prenatal Vit-Fe Fumarate-FA (PRENATAL VITAMIN PO)   Yes No   Sig: Take by mouth   Pyridoxine HCl (vitamin B-6) 50 MG TABS   No No   Sig: Take 1 tablet (50 mg total) by mouth daily   Riboflavin 400 MG CAPS   Yes No   Sig: Take 400 mg by mouth daily   Patient not taking: Reported on 6/22/2024   albuterol (PROVENTIL HFA,VENTOLIN HFA) 90 mcg/act inhaler  Self Yes No   Sig: Inhale 2 puffs every 6 (six) hours as needed   ascorbic acid (VITAMIN C) 250 mg tablet   Yes No   Sig: Take 250 mg by mouth daily   chlorhexidine (PERIDEX) 0.12 % solution   Yes No   Sig: 15 ML TO THE MOUTH OR THROAT TWICE A DAY FOR 14 DAYS AS DIRECTED BY PHYSICIAN   Patient not taking: Reported on 6/22/2024   doxylamine (UNISOM) 25 MG tablet   No No   Sig: Take 1 tablet (25 mg total) by mouth daily at bedtime as needed for sleep   Patient not taking: Reported on 6/22/2024   ferrous sulfate 325 (65 Fe) mg tablet   Yes No   Sig: Take 325 mg by mouth daily   Patient not taking: Reported on 6/22/2024      Facility-Administered Medications: None     Discharge Medication List as of 1/2/2025  2:17 AM        CONTINUE these medications which have NOT CHANGED    Details   albuterol (PROVENTIL HFA,VENTOLIN HFA) 90 mcg/act inhaler Inhale 2 puffs every 6 (six) hours as needed, Starting Sat 5/20/2023, Historical Med      ascorbic acid (VITAMIN C) 250 mg tablet Take 250 mg by mouth daily, Starting Fri 4/12/2024, Historical Med      chlorhexidine (PERIDEX) 0.12 % solution 15 ML TO THE MOUTH OR THROAT TWICE A DAY FOR 14 DAYS AS DIRECTED BY PHYSICIAN, Historical Med      doxylamine (UNISOM) 25 MG tablet Take 1 tablet (25 mg total) by mouth daily  at bedtime as needed for sleep, Starting Thu 5/16/2024, Normal      ferrous sulfate 325 (65 Fe) mg tablet Take 325 mg by mouth daily, Starting Fri 4/12/2024, Historical Med      Magnesium 400 MG TABS Take 1 tablet by mouth daily, Starting Mon 5/13/2024, Historical Med      Prenatal Vit-Fe Fumarate-FA (PRENATAL VITAMIN PO) Take by mouth, Historical Med      Pyridoxine HCl (vitamin B-6) 50 MG TABS Take 1 tablet (50 mg total) by mouth daily, Starting Thu 5/16/2024, Normal      Riboflavin 400 MG CAPS Take 400 mg by mouth daily, Starting Mon 5/13/2024, Historical Med           No discharge procedures on file.  ED SEPSIS DOCUMENTATION   Time reflects when diagnosis was documented in both MDM as applicable and the Disposition within this note       Time User Action Codes Description Comment    1/2/2025  2:14 AM Cam Sullivan [R07.9] Chest pain     1/2/2025  2:15 AM Cam Sullivan [R42] Lightheaded                  Cam Sullivan MD  01/06/25 1941